# Patient Record
Sex: FEMALE | Race: WHITE | Employment: FULL TIME | URBAN - METROPOLITAN AREA
[De-identification: names, ages, dates, MRNs, and addresses within clinical notes are randomized per-mention and may not be internally consistent; named-entity substitution may affect disease eponyms.]

---

## 2017-01-18 ENCOUNTER — OFFICE VISIT (OUTPATIENT)
Dept: OBGYN | Facility: CLINIC | Age: 47
End: 2017-01-18
Payer: COMMERCIAL

## 2017-01-18 VITALS
DIASTOLIC BLOOD PRESSURE: 86 MMHG | HEIGHT: 65 IN | RESPIRATION RATE: 18 BRPM | WEIGHT: 162.6 LBS | HEART RATE: 73 BPM | SYSTOLIC BLOOD PRESSURE: 126 MMHG | BODY MASS INDEX: 27.09 KG/M2 | OXYGEN SATURATION: 99 %

## 2017-01-18 DIAGNOSIS — Z01.419 ENCOUNTER FOR GYNECOLOGICAL EXAMINATION WITHOUT ABNORMAL FINDING: Primary | ICD-10-CM

## 2017-01-18 DIAGNOSIS — Z85.43 PERSONAL HISTORY OF OVARIAN CANCER: ICD-10-CM

## 2017-01-18 LAB — CANCER AG125 SERPL-ACNC: 7 U/ML (ref 0–30)

## 2017-01-18 PROCEDURE — 99396 PREV VISIT EST AGE 40-64: CPT | Performed by: OBSTETRICS & GYNECOLOGY

## 2017-01-18 PROCEDURE — 86304 IMMUNOASSAY TUMOR CA 125: CPT | Performed by: OBSTETRICS & GYNECOLOGY

## 2017-01-18 PROCEDURE — 36415 COLL VENOUS BLD VENIPUNCTURE: CPT | Performed by: OBSTETRICS & GYNECOLOGY

## 2017-01-18 RX ORDER — OLOPATADINE HYDROCHLORIDE 2 MG/ML
1 SOLUTION/ DROPS OPHTHALMIC DAILY
COMMUNITY
End: 2017-02-06

## 2017-01-18 NOTE — PROGRESS NOTES
Bjorn Sykes is a 46 year old female , who presents for annual exam.   No unusual bleeding, no incontinence, or unusual discharge.   She is being followed for history of ovarian cancer.  She is now being seen annually for the cancer.     Last cholesterol:   Recent Labs   Lab Test  16   0818  12/06/10   0939 08   CHOL  222*  199  181   HDL  76  69  55   LDL  133*  117  117   TRIG  63  70  43   CHOLHDLRATIO   --   2.9  3.3     Past Medical History   Diagnosis Date     Migraine      Adnexal mass 2009     Ovarian carcinoma (H) 2009     LEFT side   Dr.Jonson Curt PANIAGUA     Plantar warts s-     Dysmenorrhea      [facility] Helps     Osteopenia      [facility]     Diagnostic skin and sensitization tests  skin tests per [facility] pos. for M/T only.  skin tests [facility] pos. for: dog/DM/M only (T not retested)     Intermittent asthma        Past Surgical History   Procedure Laterality Date     D & c  2007     cysts in uterus     Hysterectomy total abdominal, bilateral salpingo-oophorectomy, combined  2009     appy also     Appendectomy open  2009     see above     Bunionectomy  10/16/2012     Procedure: BUNIONECTOMY;  Right Theo bunionectomy;  Surgeon: Daniel Arias MD;  Location: [facility]     Hysterectomy, pap no longer indicated         Obstetric History       T0      TAB0   SAB0   E0   M0   L0           Gyn History:  Gynecological History         Patient's last menstrual period was 2009.     no STD/no PID/no IUD      history of abnormal pap smear:  no  Last pap: PAP      NIL   2016        Current Outpatient Prescriptions   Medication Sig Dispense Refill     olopatadine HCl (PATADAY) 0.2 % SOLN 1 drop daily       mometasone (NASONEX) 50 MCG/ACT nasal spray Spray 2 sprays into both nostrils daily 3 Box 4     albuterol (PROAIR HFA, PROVENTIL HFA, VENTOLIN HFA) 108 (90 BASE) MCG/ACT inhaler Inhale 2 puffs into the lungs every 4 hours as needed for shortness of  "breath / dyspnea 1 Inhaler 2     mometasone (ELOCON) 0.1 % cream Apply topically daily as needed Apply 1 dose topically daily as needed 45 g 3     cetirizine (ZYRTEC) 10 MG tablet Take 1 tablet (10 mg) by mouth daily 90 tablet 3     Calcium Carbonate-Vitamin D (CALCIUM 500 + D PO) Take by mouth 2 times daily       Glucosamine-Chondroit-Vit C-Mn (GLUCOSAMINE CHONDR 1500 COMPLX PO) Take  by mouth 2 times daily.       ascorbic acid (VITAMIN C) 500 MG tablet Take 500 mg by mouth as needed.       IBUPROFEN PO Take  by mouth as needed.       MULTIVITAMIN OR once daily         Allergies   Allergen Reactions     Flagyl [Imidazole Antifungals] Rash     Sulfa Drugs Rash       Social History     Social History     Marital Status:      Spouse Name: N/A     Number of Children: N/A     Years of Education: N/A     Occupational History     Not on file.     Social History Main Topics     Smoking status: Never Smoker      Smokeless tobacco: Never Used     Alcohol Use: Yes      Comment: occasional     Drug Use: No     Sexual Activity:     Partners: Male     Birth Control/ Protection: Surgical     Other Topics Concern     Not on file     Social History Narrative       Family History   Problem Relation Age of Onset     Alcohol/Drug Father      C.A.D. Paternal Grandfather      Hypertension Paternal Grandfather      CEREBROVASCULAR DISEASE Paternal Grandfather      Genetic Disorder Sister      Angelman's syndrome     Breast Cancer Other      aunt, maternal     Alzheimer Disease Other      aunt     Thyroid Disease Sister      Thyroid Disease Paternal Grandmother      Macular Degeneration No family hx of      Glaucoma No family hx of          ROS:  All negative except as above.      EXAM:  /86 mmHg  Pulse 73  Resp 18  Ht 5' 4.5\" (1.638 m)  Wt 162 lb 9.6 oz (73.755 kg)  BMI 27.49 kg/m2  SpO2 99%  LMP 07/05/2009  Breastfeeding? No  General:  WNWD female, NAD  Alert  Oriented x 3  Gait:  Normal  Skin:  Normal skin " turgor  Neurologic:  CN grossly intact, good sensation.    HEENT:  NC/AT, EOMI  Neck:  No masses palpated, symmetrical, carotids +2/4, no bruits heard  Heart:  RRR  Lungs:  Clear   Breasts:  Symmetrical, no dimpling noted, no masses palpated, no discharge expressed  Abdomen:  Non-tender, non-distended.  Vulva: No external lesions, normal hair distribution, no adenopathy  BUS:  Normal, no masses noted  Urethra:  No hypermobility noted  Urethral meatus:  No masses noted  Vagina: Moist, pink, no abnormal discharge, well rugated, no lesions  Cervix: Smooth, pink, no visible lesions  Uterus: Normal size, anteverted, non-tender, mobile  Ovaries: No mass, non-tender, mobile  Perianal:  No masses noted.    Rectal exam: No mass, non-tender, normal sphincter tone  Rectovaginal exam:  No masses palpated.    Extremities:  No clubbing, cyanosis, or edema      ASSESSMENT/PLAN   Annual examination   Pap smear not performed.    ordered.   Low fat diet, weight bearing exercises and self breast exams on a monthly basis have been recommended.  I have discussed with patient the risks, benefits, medications, treatment options and modalities.   I have instructed the patient to call or schedule a follow-up appointment if any problems.

## 2017-01-18 NOTE — NURSING NOTE
"Chief Complaint   Patient presents with     Physical     Annual Female       Initial /86 mmHg  Pulse 73  Resp 18  Ht 5' 4.5\" (1.638 m)  Wt 162 lb 9.6 oz (73.755 kg)  BMI 27.49 kg/m2  SpO2 99%  LMP 07/05/2009  Breastfeeding? No Estimated body mass index is 27.49 kg/(m^2) as calculated from the following:    Height as of this encounter: 5' 4.5\" (1.638 m).    Weight as of this encounter: 162 lb 9.6 oz (73.755 kg).  BP completed using cuff size: mel Gage MA 1/18/2017         "

## 2017-02-06 ENCOUNTER — TELEPHONE (OUTPATIENT)
Dept: OPHTHALMOLOGY | Facility: CLINIC | Age: 47
End: 2017-02-06

## 2017-02-06 ENCOUNTER — TELEPHONE (OUTPATIENT)
Dept: FAMILY MEDICINE | Facility: CLINIC | Age: 47
End: 2017-02-06

## 2017-02-06 DIAGNOSIS — J30.2 SEASONAL ALLERGIES: Primary | ICD-10-CM

## 2017-02-06 DIAGNOSIS — H18.519 CORNEA GUTTATA: Primary | ICD-10-CM

## 2017-02-06 DIAGNOSIS — J30.2 SEASONAL ALLERGIES: ICD-10-CM

## 2017-02-06 DIAGNOSIS — J45.20 ASTHMA, INTERMITTENT, UNCOMPLICATED: ICD-10-CM

## 2017-02-06 DIAGNOSIS — L20.89 FLEXURAL ATOPIC DERMATITIS: Primary | ICD-10-CM

## 2017-02-06 RX ORDER — OLOPATADINE HYDROCHLORIDE 2 MG/ML
1 SOLUTION/ DROPS OPHTHALMIC DAILY
Qty: 1 BOTTLE | Refills: 11 | Status: SHIPPED
Start: 2017-02-06 | End: 2018-03-26

## 2017-02-06 RX ORDER — OLOPATADINE HYDROCHLORIDE 2 MG/ML
1 SOLUTION/ DROPS OPHTHALMIC DAILY
Qty: 1 BOTTLE | Refills: 11 | Status: SHIPPED | OUTPATIENT
Start: 2017-02-06 | End: 2017-02-06

## 2017-02-06 NOTE — Clinical Note
[address] [place] [address]  [phone]      February 9, 2017      Bjorn Sykes  [address] [place] [address]              Dear Bjorn,    Regarding recent refill request's we received- refill's have been sent to the pharmacy.  Please contact our office if you have any questions.      Sincerely,      Lalo Ron MD

## 2017-02-06 NOTE — TELEPHONE ENCOUNTER
mometasone (ELOCON) 0.1 % cream      Last Written Prescription Date: 11/10/15  Last Fill Quantity: 45G,  # refills: 3   Last Office Visit with [facility], [facility] or [facility] prescribing provider: 11/10/15                                             mometasone (NASONEX) 50 MCG/ACT nasal spray      Last Written Prescription Date: 11/10/15  Last Fill Quantity: 3BOX,  # refills: 4   Last Office Visit with [facility], [facility] or [facility] prescribing provider: 11/10/15

## 2017-02-06 NOTE — TELEPHONE ENCOUNTER
Received an email from [employer] requesting the completion of a Prior Auth for Dashawn.    Request completed.

## 2017-02-07 RX ORDER — MOMETASONE FUROATE MONOHYDRATE 50 UG/1
2 SPRAY, METERED NASAL DAILY
Qty: 3 BOX | Refills: 4 | Status: CANCELLED | OUTPATIENT
Start: 2017-02-07

## 2017-02-07 NOTE — TELEPHONE ENCOUNTER
Mometasone 50 mcg        Last Written Prescription Date: 9/4/16  Last Fill Quantity: 51, # refills: 3 box    Last Office Visit with [facility] or [facility] prescribing provider:  11/10/15   Future Office Visit:   none    Date of Last Asthma Action Plan Letter:   There are no preventive care reminders to display for this patient.   Asthma Control Test:   ACT Total Scores 11/10/2015   ACT TOTAL SCORE -   ASTHMA ER VISITS -   ASTHMA HOSPITALIZATIONS -   ACT TOTAL SCORE (Goal Greater than or Equal to 20) 24   In the past 12 months, how many times did you visit the emergency room for your asthma without being admitted to the hospital? 0   In the past 12 months, how many times were you hospitalized overnight because of your asthma? 0       Date of Last Spirometry Test:   No results found for this or any previous visit.

## 2017-02-09 RX ORDER — MOMETASONE FUROATE MONOHYDRATE 50 UG/1
2 SPRAY, METERED NASAL DAILY
Qty: 3 BOX | Refills: 4 | Status: SHIPPED | OUTPATIENT
Start: 2017-02-09 | End: 2018-03-12

## 2017-02-09 RX ORDER — MOMETASONE FUROATE 1 MG/G
CREAM TOPICAL DAILY PRN
Qty: 45 G | Refills: 3 | Status: SHIPPED | OUTPATIENT
Start: 2017-02-09 | End: 2018-03-26

## 2017-02-09 NOTE — TELEPHONE ENCOUNTER
Previous patient of Dr. Vee  Would need to see allergy or pcp to refill her medication  Maida Esquivel MD

## 2017-02-09 NOTE — TELEPHONE ENCOUNTER
Sent over Zaditor to see if insurance covers this instead of Pataday.  Can sub for Patanol if necessary

## 2017-02-09 NOTE — TELEPHONE ENCOUNTER
Routing refill request to provider for review/approval because:  Patient needs to be seen because it has been more than 1 year since last office visit.    Angeli HUBBARD RN, BSN

## 2017-02-13 ENCOUNTER — TELEPHONE (OUTPATIENT)
Dept: OPHTHALMOLOGY | Facility: CLINIC | Age: 47
End: 2017-02-13

## 2017-02-13 NOTE — TELEPHONE ENCOUNTER
Spoke to pharmacy and they have the generic Patanol ready for her to .  We had received a denial letter for Pataday, so substituted the generic.

## 2017-02-14 ENCOUNTER — TELEPHONE (OUTPATIENT)
Dept: OBGYN | Facility: CLINIC | Age: 47
End: 2017-02-14

## 2017-02-14 DIAGNOSIS — J45.20 MILD INTERMITTENT ASTHMA WITHOUT COMPLICATION: ICD-10-CM

## 2017-02-14 RX ORDER — ALBUTEROL SULFATE 90 UG/1
2 AEROSOL, METERED RESPIRATORY (INHALATION) EVERY 4 HOURS PRN
Qty: 1 INHALER | Refills: 2 | Status: SHIPPED | OUTPATIENT
Start: 2017-02-14 | End: 2018-03-26

## 2017-02-14 NOTE — TELEPHONE ENCOUNTER
Reason for call: Medication   If this is a refill request, has the caller requested the refill from the pharmacy already? No  Will the patient be using a [facility]? No  Name of the pharmacy and phone number for the current request: [facility] [address] [facility], [place] - [address] [facility]    Name of the medication requested: albuterol (PROAIR HFA, PROVENTIL HFA, VENTOLIN HFA    Other request: Original provider has left the clinic; is running a 10K and noticed the current inhaler has .    Phone Number Pt can be reached at: Home number on file [phone] (home)  Best Time: anytime  Can we leave a detailed message on this number? YES

## 2017-02-14 NOTE — TELEPHONE ENCOUNTER
Last seen in clinic 1/18/2017 for her annual physical.  Asthma was not addressed.  Last ACT score was in 2015.  Need a new order.  Routed to Dr. Ron.  ACT Total Scores 9/18/2013 11/10/2015   ACT TOTAL SCORE 25 -   ASTHMA ER VISITS 0 = None -   ASTHMA HOSPITALIZATIONS 0 = None -   ACT TOTAL SCORE (Goal Greater than or Equal to 20) - 24   In the past 12 months, how many times did you visit the emergency room for your asthma without being admitted to the hospital? - 0   In the past 12 months, how many times were you hospitalized overnight because of your asthma? - 0       Esperanza Womack RN

## 2017-09-24 ENCOUNTER — HEALTH MAINTENANCE LETTER (OUTPATIENT)
Age: 47
End: 2017-09-24

## 2018-02-05 ENCOUNTER — OFFICE VISIT (OUTPATIENT)
Dept: FAMILY MEDICINE | Facility: CLINIC | Age: 48
End: 2018-02-05
Payer: COMMERCIAL

## 2018-02-05 VITALS
HEIGHT: 65 IN | HEART RATE: 82 BPM | DIASTOLIC BLOOD PRESSURE: 78 MMHG | WEIGHT: 163 LBS | TEMPERATURE: 99.4 F | BODY MASS INDEX: 27.16 KG/M2 | OXYGEN SATURATION: 98 % | SYSTOLIC BLOOD PRESSURE: 126 MMHG

## 2018-02-05 DIAGNOSIS — R07.0 THROAT PAIN: ICD-10-CM

## 2018-02-05 DIAGNOSIS — J11.1 INFLUENZA-LIKE ILLNESS: Primary | ICD-10-CM

## 2018-02-05 LAB
DEPRECATED S PYO AG THROAT QL EIA: NORMAL
SPECIMEN SOURCE: NORMAL

## 2018-02-05 PROCEDURE — 99213 OFFICE O/P EST LOW 20 MIN: CPT | Performed by: PHYSICIAN ASSISTANT

## 2018-02-05 PROCEDURE — 87081 CULTURE SCREEN ONLY: CPT | Performed by: PHYSICIAN ASSISTANT

## 2018-02-05 PROCEDURE — 87880 STREP A ASSAY W/OPTIC: CPT | Performed by: PHYSICIAN ASSISTANT

## 2018-02-05 RX ORDER — OSELTAMIVIR PHOSPHATE 75 MG/1
75 CAPSULE ORAL 2 TIMES DAILY
Qty: 10 CAPSULE | Refills: 0 | Status: SHIPPED | OUTPATIENT
Start: 2018-02-05 | End: 2018-03-26

## 2018-02-05 NOTE — NURSING NOTE
"Chief Complaint   Patient presents with     Flu Symptoms       Initial /78  Pulse 82  Temp 99.4  F (37.4  C) (Tympanic)  Ht 5' 4.5\" (1.638 m)  Wt 163 lb (73.9 kg)  LMP 07/05/2009  SpO2 98%  BMI 27.55 kg/m2 Estimated body mass index is 27.55 kg/(m^2) as calculated from the following:    Height as of this encounter: 5' 4.5\" (1.638 m).    Weight as of this encounter: 163 lb (73.9 kg).  Medication Reconciliation: complete     Saúl Juarez, SHAYY    "

## 2018-02-05 NOTE — PATIENT INSTRUCTIONS
Influenza (Adult)    Influenza is also called the flu. It is a viral illness that affects the air passages of your lungs. It is different from the common cold. The flu can easily be passed from one to person to another. It may be spread through the air by coughing and sneezing. Or it can be spread by touching the sick person and then touching your own eyes, nose, or mouth.  The flu starts 1 to 3 days after you are exposed to the flu virus. It may last for 1 to 2 weeks but many people feel tired or fatigued for many weeks afterward. You usually don t need to take antibiotics unless you have a complication. This might be an ear or sinus infection or pneumonia.  Symptoms of the flu may be mild or severe. They can include extreme tiredness (wanting to stay in bed all day), chills, fevers, muscle aches, soreness with eye movement, headache, and a dry, hacking cough.  Home care  Follow these guidelines when caring for yourself at home:    Avoid being around cigarette smoke, whether yours or other people s.    Acetaminophen or ibuprofen will help ease your fever, muscle aches, and headache. Don t give aspirin to anyone younger than 18 who has the flu. Aspirin can harm the liver.    Nausea and loss of appetite are common with the flu. Eat light meals. Drink 6 to 8 glasses of liquids every day. Good choices are water, sport drinks, soft drinks without caffeine, juices, tea, and soup. Extra fluids will also help loosen secretions in your nose and lungs.    Over-the-counter cold medicines will not make the flu go away faster. But the medicines may help with coughing, sore throat, and congestion in your nose and sinuses. Don t use a decongestant if you have high blood pressure.    Stay home until your fever has been gone for at least 24 hours without using medicine to reduce fever.  Follow-up care  Follow up with your healthcare provider, or as advised, if you are not getting better over the next week.  If you are age 65 or  older, talk with your provider about getting a pneumococcal vaccine every 5 years. You should also get this vaccine if you have chronic asthma or COPD. All adults should get a flu vaccine every fall. Ask your provider about this.  When to seek medical advice  Call your healthcare provider right away if any of these occur:    Cough with lots of colored mucus (sputum) or blood in your mucus    Chest pain, shortness of breath, wheezing, or trouble breathing    Severe headache, or face, neck, or ear pain    New rash with fever    Fever of 100.4 F (38 C) or higher, or as directed by your healthcare provider    Confusion, behavior change, or seizure    Severe weakness or dizziness    You get a new fever or cough after getting better for a few days  Date Last Reviewed: 1/1/2017 2000-2017 [employer]. [address] [place] [address], [place] [address]. All rights reserved. This information is not intended as a substitute for professional medical care. Always follow your healthcare professional's instructions.

## 2018-02-05 NOTE — MR AVS SNAPSHOT
After Visit Summary   2/5/2018    Bjorn Sykes    MRN: [id number]           Patient Information     Date Of Birth          1970        Visit Information        Provider Department      2/5/2018 1:40 PM Jaqui Krishnamurthy PA-C [facility]        Today's Diagnoses     Influenza-like illness    -  1    Throat pain          Care Instructions      Influenza (Adult)    Influenza is also called the flu. It is a viral illness that affects the air passages of your lungs. It is different from the common cold. The flu can easily be passed from one to person to another. It may be spread through the air by coughing and sneezing. Or it can be spread by touching the sick person and then touching your own eyes, nose, or mouth.  The flu starts 1 to 3 days after you are exposed to the flu virus. It may last for 1 to 2 weeks but many people feel tired or fatigued for many weeks afterward. You usually don t need to take antibiotics unless you have a complication. This might be an ear or sinus infection or pneumonia.  Symptoms of the flu may be mild or severe. They can include extreme tiredness (wanting to stay in bed all day), chills, fevers, muscle aches, soreness with eye movement, headache, and a dry, hacking cough.  Home care  Follow these guidelines when caring for yourself at home:    Avoid being around cigarette smoke, whether yours or other people s.    Acetaminophen or ibuprofen will help ease your fever, muscle aches, and headache. Don t give aspirin to anyone younger than 18 who has the flu. Aspirin can harm the liver.    Nausea and loss of appetite are common with the flu. Eat light meals. Drink 6 to 8 glasses of liquids every day. Good choices are water, sport drinks, soft drinks without caffeine, juices, tea, and soup. Extra fluids will also help loosen secretions in your nose and lungs.    Over-the-counter cold medicines will not make the flu go away faster. But the medicines  may help with coughing, sore throat, and congestion in your nose and sinuses. Don t use a decongestant if you have high blood pressure.    Stay home until your fever has been gone for at least 24 hours without using medicine to reduce fever.  Follow-up care  Follow up with your healthcare provider, or as advised, if you are not getting better over the next week.  If you are age 65 or older, talk with your provider about getting a pneumococcal vaccine every 5 years. You should also get this vaccine if you have chronic asthma or COPD. All adults should get a flu vaccine every fall. Ask your provider about this.  When to seek medical advice  Call your healthcare provider right away if any of these occur:    Cough with lots of colored mucus (sputum) or blood in your mucus    Chest pain, shortness of breath, wheezing, or trouble breathing    Severe headache, or face, neck, or ear pain    New rash with fever    Fever of 100.4 F (38 C) or higher, or as directed by your healthcare provider    Confusion, behavior change, or seizure    Severe weakness or dizziness    You get a new fever or cough after getting better for a few days  Date Last Reviewed: 1/1/2017 2000-2017 The "[employer]". [address] [place] [address]. All rights reserved. This information is not intended as a substitute for professional medical care. Always follow your healthcare professional's instructions.                Follow-ups after your visit        Who to contact     Normal or non-critical lab and imaging results will be communicated to you by MyChart, letter or phone within 4 business days after the clinic has received the results. If you do not hear from us within 7 days, please contact the clinic through MyChart or phone. If you have a critical or abnormal lab result, we will notify you by phone as soon as possible.  Submit refill requests through [employer] or call your pharmacy and they will forward the refill request to us.  "Please allow 3 business days for your refill to be completed.          If you need to speak with a  for additional information , please call: [phone]             Additional Information About Your Visit        [email or web address] Information     [employer] gives you secure access to your electronic health record. If you see a primary care provider, you can also send messages to your care team and make appointments. If you have questions, please call your primary care clinic.  If you do not have a primary care provider, please call [phone] and they will assist you.        Care EveryWhere ID     This is your Care EveryWhere ID. This could be used by other organizations to access your [facility] medical records  [id number]        Your Vitals Were     Pulse Temperature Height Last Period Pulse Oximetry BMI (Body Mass Index)    82 99.4  F (37.4  C) (Tympanic) 5' 4.5\" (1.638 m) 07/05/2009 98% 27.55 kg/m2       Blood Pressure from Last 3 Encounters:   02/05/18 126/78   01/18/17 126/86   12/20/16 (!) 154/95    Weight from Last 3 Encounters:   02/05/18 163 lb (73.9 kg)   01/18/17 162 lb 9.6 oz (73.8 kg)   05/06/16 165 lb (74.8 kg)              We Performed the Following     Beta strep group A culture     Strep, Rapid Screen          Today's Medication Changes          These changes are accurate as of 2/5/18  2:16 PM.  If you have any questions, ask your nurse or doctor.               Start taking these medicines.        Dose/Directions    oseltamivir 75 MG capsule   Commonly known as:  TAMIFLU   Used for:  Influenza-like illness   Started by:  Jaqui Krishnamurthy PA-C        Dose:  75 mg   Take 1 capsule (75 mg) by mouth 2 times daily   Quantity:  10 capsule   Refills:  0            Where to get your medicines      These medications were sent to [facility] - [address]  [address] [place] [address]     Phone:  [phone]     oseltamivir 75 MG capsule       "          Primary Care Provider Office Phone # Fax #    Lalo Denzel Ron [phone] [address] [facility]  [facility] [address]        Equal Access to Services     STEPHON LING : Hadii jon ku zulyo Somontserratali, waaxda luqadaha, qaybta kaalmada adehector, whitley royaltaya mcdonald. So [facility] [phone].    ATENCIÓN: Si habla español, tiene a simmons disposición servicios gratuitos de asistencia lingüística. Llame al [phone].    We comply with applicable federal civil rights laws and [place] laws. We do not discriminate on the basis of race, color, national origin, age, disability, sex, sexual orientation, or gender identity.            Thank you!     Thank you for choosing [facility]  for your care. Our goal is always to provide you with excellent care. Hearing back from our patients is one way we can continue to improve our services. Please take a few minutes to complete the written survey that you may receive in the mail after your visit with us. Thank you!             Your Updated Medication List - Protect others around you: Learn how to safely use, store and throw away your medicines at www.disposemymeds.org.          This list is accurate as of 2/5/18  2:16 PM.  Always use your most recent med list.                   Brand Name Dispense Instructions for use Diagnosis    albuterol 108 (90 BASE) MCG/ACT Inhaler    PROAIR HFA/PROVENTIL HFA/VENTOLIN HFA    1 Inhaler    Inhale 2 puffs into the lungs every 4 hours as needed for shortness of breath / dyspnea    Mild intermittent asthma without complication       ascorbic acid 500 MG tablet    VITAMIN C     Take 500 mg by mouth as needed.        CALCIUM 500 + D PO      Take by mouth 2 times daily        cetirizine 10 MG tablet    zyrTEC    90 tablet    Take 1 tablet (10 mg) by mouth daily    Dermatitis       GLUCOSAMINE CHONDR 1500 COMPLX PO      Take  by mouth 2 times daily.        IBUPROFEN PO      Take  by mouth  as needed.        ketotifen 0.025 % Soln ophthalmic solution    ZADITOR/REFRESH ANTI-ITCH    1 Bottle    Place 1 drop into both eyes 2 times daily    Seasonal allergies       mometasone 0.1 % cream    ELOCON    45 g    Apply topically daily as needed Apply 1 dose topically daily as needed    Flexural atopic dermatitis       mometasone 50 MCG/ACT spray    NASONEX    3 Box    Spray 2 sprays into both nostrils daily    Asthma, intermittent, uncomplicated       MULTIVITAMIN PO      once daily    Pelvic mass       olopatadine HCl 0.2 % Soln    PATADAY    1 Bottle    Place 1 drop into both eyes daily    Cornea guttata, Seasonal allergies       oseltamivir 75 MG capsule    TAMIFLU    10 capsule    Take 1 capsule (75 mg) by mouth 2 times daily    Influenza-like illness

## 2018-02-06 LAB
BACTERIA SPEC CULT: NORMAL
SPECIMEN SOURCE: NORMAL

## 2018-03-12 DIAGNOSIS — J45.20 ASTHMA, INTERMITTENT, UNCOMPLICATED: ICD-10-CM

## 2018-03-12 NOTE — TELEPHONE ENCOUNTER
Routing refill request to provider for review/approval because:  Drug not on the [facility] refill protocol       Requested Prescriptions   Pending Prescriptions Disp Refills     mometasone (NASONEX) 50 MCG/ACT spray 3 Box 4     Sig: Spray 2 sprays into both nostrils daily    There is no refill protocol information for this order        Isamar Esquivel, DIONE - BC

## 2018-03-22 RX ORDER — MOMETASONE FUROATE MONOHYDRATE 50 UG/1
2 SPRAY, METERED NASAL DAILY
Qty: 1 BOX | Refills: 0 | Status: SHIPPED | OUTPATIENT
Start: 2018-03-22 | End: 2018-03-26

## 2018-03-22 NOTE — TELEPHONE ENCOUNTER
Message handled by Nurse Triage with [facility] - provider name: Dr Ron.Refilling maximo refill of Nasonex x 1 month    Next 5 appointments (look out 90 days)     Mar 26, 2018  4:40 PM CDT   [facility] with Maulik Titus MD   [facility]    [address] [address]   [phone]            Apr 04, 2018  1:15 PM CDT   PHYSICAL with Lalo Ron MD   [facility]    [address] [place] [address]  [place] [address]   [phone]                 Abby Cullen RN

## 2018-03-26 ENCOUNTER — OFFICE VISIT (OUTPATIENT)
Dept: ALLERGY | Facility: CLINIC | Age: 48
End: 2018-03-26
Payer: COMMERCIAL

## 2018-03-26 VITALS
TEMPERATURE: 97.1 F | HEART RATE: 64 BPM | WEIGHT: 162.48 LBS | DIASTOLIC BLOOD PRESSURE: 86 MMHG | RESPIRATION RATE: 16 BRPM | BODY MASS INDEX: 27.46 KG/M2 | SYSTOLIC BLOOD PRESSURE: 132 MMHG | OXYGEN SATURATION: 100 %

## 2018-03-26 DIAGNOSIS — L30.8 OTHER ECZEMA: ICD-10-CM

## 2018-03-26 DIAGNOSIS — J30.1 CHRONIC SEASONAL ALLERGIC RHINITIS DUE TO POLLEN: Primary | ICD-10-CM

## 2018-03-26 DIAGNOSIS — H10.13 ALLERGIC CONJUNCTIVITIS, BILATERAL: ICD-10-CM

## 2018-03-26 DIAGNOSIS — J45.20 ASTHMA, INTERMITTENT, UNCOMPLICATED: ICD-10-CM

## 2018-03-26 PROCEDURE — 94010 BREATHING CAPACITY TEST: CPT | Performed by: ALLERGY & IMMUNOLOGY

## 2018-03-26 PROCEDURE — 99214 OFFICE O/P EST MOD 30 MIN: CPT | Mod: 25 | Performed by: ALLERGY & IMMUNOLOGY

## 2018-03-26 RX ORDER — MOMETASONE FUROATE MONOHYDRATE 50 UG/1
2 SPRAY, METERED NASAL DAILY
Qty: 3 BOX | Refills: 3 | Status: SHIPPED | OUTPATIENT
Start: 2018-03-26 | End: 2019-04-23

## 2018-03-26 RX ORDER — OLOPATADINE HYDROCHLORIDE 2 MG/ML
1 SOLUTION/ DROPS OPHTHALMIC DAILY
Qty: 1 BOTTLE | Refills: 11 | Status: SHIPPED | OUTPATIENT
Start: 2018-03-26 | End: 2019-04-10

## 2018-03-26 RX ORDER — ALBUTEROL SULFATE 90 UG/1
2 AEROSOL, METERED RESPIRATORY (INHALATION) EVERY 4 HOURS PRN
Qty: 1 INHALER | Refills: 2 | Status: SHIPPED | OUTPATIENT
Start: 2018-03-26

## 2018-03-26 RX ORDER — MOMETASONE FUROATE 1 MG/G
CREAM TOPICAL DAILY PRN
Qty: 45 G | Refills: 3 | Status: SHIPPED | OUTPATIENT
Start: 2018-03-26

## 2018-03-26 ASSESSMENT — ENCOUNTER SYMPTOMS
WHEEZING: 0
MYALGIAS: 0
CHEST TIGHTNESS: 0
ARTHRALGIAS: 0
EYE REDNESS: 0
HEADACHES: 0
NAUSEA: 0
FATIGUE: 1
EYE DISCHARGE: 0
COUGH: 0
VOMITING: 0
ADENOPATHY: 0
FACIAL SWELLING: 0
SINUS PRESSURE: 0
DIARRHEA: 0
CHILLS: 0
RHINORRHEA: 1
EYE ITCHING: 0
JOINT SWELLING: 0
SHORTNESS OF BREATH: 0
ACTIVITY CHANGE: 0
FEVER: 0

## 2018-03-26 NOTE — MR AVS SNAPSHOT
After Visit Summary   3/26/2018    Bjorn Sykes    MRN: [id number]           Patient Information     Date Of Birth          1970        Visit Information        Provider Department      3/26/2018 4:40 PM Maulik Titus MD [facility]        Today's Diagnoses     Chronic seasonal allergic rhinitis due to pollen    -  1    Asthma, intermittent, uncomplicated        Allergic conjunctivitis, bilateral        Other eczema           Follow-ups after your visit        Follow-up notes from your care team     Return in about 1 year (around 3/26/2019), or if symptoms worsen or fail to improve, for rhinitis follow up, asthma follow up, eczema follow up.      Your next 10 appointments already scheduled     Apr 04, 2018 12:45 PM CDT   (Arrive by 12:30 PM)   MA SCREENING DIGITAL BILATERAL with FKMA1   [facility]    [address] [place] [id number] [phone]           Do not use any powder, lotion or deodorant under your arms or on your breast. If you do, we will ask you to remove it before your exam.  Wear comfortable, two-piece clothing.  If you have any allergies, tell your care team.  Bring any previous mammograms from other facilities or have them mailed to the breast center.            Apr 04, 2018  1:15 PM CDT   PHYSICAL with Lalo Ron MD   [facility]    [address] [place] [id number] [phone]              Who to contact     If you have questions or need follow up information about today's clinic visit or your schedule please contact [facility] directly at [phone].  Normal or non-critical lab and imaging results will be communicated to you by MyChart, letter or phone within 4 business days after the clinic has received the results. If you do not hear from us within 7 days, please contact the clinic through MyChart or phone. If you  have a critical or abnormal lab result, we will notify you by phone as soon as possible.  Submit refill requests through [employer] or call your pharmacy and they will forward the refill request to us. Please allow 3 business days for your refill to be completed.          Additional Information About Your Visit        [employer] Information     [employer] gives you secure access to your electronic health record. If you see a primary care provider, you can also send messages to your care team and make appointments. If you have questions, please call your primary care clinic.  If you do not have a primary care provider, please call [phone] and they will assist you.        Care EveryWhere ID     This is your Care EveryWhere ID. This could be used by other organizations to access your [place] medical records  [id number]        Your Vitals Were     Pulse Temperature Respirations Last Period Pulse Oximetry BMI (Body Mass Index)    64 97.1  F (36.2  C) (Oral) 16 07/05/2009 100% 27.46 kg/m2       Blood Pressure from Last 3 Encounters:   03/26/18 132/86   02/05/18 126/78   01/18/17 126/86    Weight from Last 3 Encounters:   03/26/18 73.7 kg (162 lb 7.7 oz)   02/05/18 73.9 kg (163 lb)   01/18/17 73.8 kg (162 lb 9.6 oz)              Today, you had the following     No orders found for display         Today's Medication Changes          These changes are accurate as of 3/26/18  5:35 PM.  If you have any questions, ask your nurse or doctor.               Stop taking these medicines if you haven't already. Please contact your care team if you have questions.     ketotifen 0.025 % Soln ophthalmic solution   Commonly known as:  ZADITOR/REFRESH ANTI-ITCH   Stopped by:  Maulik Titus MD           oseltamivir 75 MG capsule   Commonly known as:  TAMIFLU   Stopped by:  Maulik Titus MD                Where to get your medicines      These medications were sent to [facility] [address] [place], [place] - [address] [facility]  [address] [place] [facility] [place] [address]     Phone:  [phone]     albuterol 108 (90 BASE) MCG/ACT Inhaler    mometasone 0.1 % cream    mometasone 50 MCG/ACT spray    olopatadine HCl 0.2 % Soln                Primary Care Provider Office Phone # Fax #    Lalo Denzel Ron [phone]       [address] [facility] [address]        Equal Access to Services     STEPHON LING : Hadii aad ku hadasho Soomaali, waaxda luqadaha, qaybta kaalmada adeegyada, waxay idiin hayaan adeeg khbrijesh maldonado . So [facility] [phone].    ATENCIÓN: Si habla español, tiene a simmons disposición servicios gratuitos de asistencia lingüística. [facility] [phone].    We comply with applicable federal civil rights laws and [place] laws. We do not discriminate on the basis of race, color, national origin, age, disability, sex, sexual orientation, or gender identity.            Thank you!     Thank you for choosing [facility]  for your care. Our goal is always to provide you with excellent care. Hearing back from our patients is one way we can continue to improve our services. Please take a few minutes to complete the written survey that you may receive in the mail after your visit with us. Thank you!             Your Updated Medication List - Protect others around you: Learn how to safely use, store and throw away your medicines at www.disposemymeds.org.          This list is accurate as of 3/26/18  5:35 PM.  Always use your most recent med list.                   Brand Name Dispense Instructions for use Diagnosis    albuterol 108 (90 BASE) MCG/ACT Inhaler    PROAIR HFA/PROVENTIL HFA/VENTOLIN HFA    1 Inhaler    Inhale 2 puffs into the lungs every 4 hours as needed for shortness of breath / dyspnea    Asthma, intermittent, uncomplicated       ascorbic acid 500 MG tablet    VITAMIN C     Take 500 mg by mouth as needed.        CALCIUM 500 + D PO      Take by mouth 2 times daily         cetirizine 10 MG tablet    zyrTEC    90 tablet    Take 1 tablet (10 mg) by mouth daily    Dermatitis       GLUCOSAMINE CHONDR 1500 COMPLX PO      Take  by mouth 2 times daily.        IBUPROFEN PO      Take  by mouth as needed.        mometasone 0.1 % cream    ELOCON    45 g    Apply topically daily as needed Apply 1 dose topically daily as needed    Other eczema       mometasone 50 MCG/ACT spray    NASONEX    3 Box    Spray 2 sprays into both nostrils daily    Asthma, intermittent, uncomplicated       MULTIVITAMIN PO      once daily    Pelvic mass       olopatadine HCl 0.2 % Soln    PATADAY    1 Bottle    Place 1 drop into both eyes daily    Allergic conjunctivitis, bilateral

## 2018-03-26 NOTE — LETTER
3/26/2018         RE: Bjorn Sykes  [address] [place] [address]        Dear Colleague,    Thank you for referring your patient, Bjorn Sykes, to the [facility]. Please see a copy of my visit note below.    SUBJECTIVE:                                                               Bjorn Sykes presents today to our Allergy Clinic at [facility] for a new patient visit.  As you know, she is a 47 year old female with  a history of asthma, eczema and allergic rhinitis.     She was previously a patient of Dr. Josiah Juarez and Dr. Vee.  The patient has a history of allergic rhinoconjunctivitis since age of 17 years. Per Dr. Vee note, skin test in the past was positive for dog, dust mite and mold. Before that birch was positive as well.   In Spring, she is using Nasonex 2 sprays in each nostril twice daily.  Rest of the seasons, she is using Nasonex 2 sprays in each nostril daily.  For her ocular symptoms she is using Pataday as needed.  On that regimen she is pretty well controlled.    She is using Nasonex 2 sprays in each nostril daily, and in Spring BID.  For her ocular symptoms she is using Pataday.  On that regimen she is pretty well controlled.  She never considered allergen immunotherapy.  Asthma symptoms, mainly exercise-induced, started in 00's.  She uses albuterol after exertion only if she develops symptoms since she is not symptomatic all the time.  She has never been hospitalized for chest symptoms.  She is not using albuterol inhaler more than twice a week.  She denies night awakenings.  She was not on prednisone for the last year.    She has been having eczema on her hands, intertriginous areas.  Usually well controlled with moisturizers, but sometimes would require Elocon with good results.      Patient Active Problem List   Diagnosis     Personal history of ovarian cancer     CARDIOVASCULAR SCREENING; LDL GOAL LESS THAN 160     Cornea  guttata, mild, ou     Hallux valgus, acquired     Diagnostic skin and sensitization tests(aka ALLERGENS)       Past Medical History:   Diagnosis Date     Adnexal mass 8/2009     Diagnostic skin and sensitization [facility]. 9/00 skin tests [facility] pos. for: dog/DM/M only (T not retested)     Dysmenorrhea     IBU Helps     Intermittent asthma      Migraine      Osteopenia     [facility]     Ovarian carcinoma (H) 8/2009    LEFT side   Dr.Jonson Curt PANIAGUA     Plantar warts 1990's-2001      Problem (# of Occurrences) Relation (Name,Age of Onset)    Alcohol/Drug (1) Father    Alzheimer Disease (1) Other: aunt    Breast Cancer (1) Other: aunt, maternal    C.A.D. (1) Paternal Grandfather    CEREBROVASCULAR DISEASE (1) Paternal Grandfather    Genetic Disorder (1) Sister (1): Angelman's syndrome    Hypertension (1) Paternal Grandfather    Thyroid Disease (2) Paternal Grandmother, Sister       Negative family history of: Macular Degeneration, Glaucoma        Past Surgical History:   Procedure Laterality Date     APPENDECTOMY OPEN  8/13/2009    see above     BUNIONECTOMY  10/16/2012    Procedure: BUNIONECTOMY;  Right Theo bunionectomy;  Surgeon: Daniel Arias MD;  Location: [facility] OR     D & C  8/2007    cysts in uterus     HYSTERECTOMY TOTAL ABDOMINAL, BILATERAL SALPINGO-OOPHORECTOMY, COMBINED  8/13/2009    appy also     HYSTERECTOMY, PAP NO LONGER INDICATED       Social History     Social History     Marital status:      Spouse name: N/A     Number of children: N/A     Years of education: N/A     Occupational History     pharmacist      Social History Main Topics     Smoking status: Never Smoker     Smokeless tobacco: Never Used     Alcohol use Yes      Comment: occasional     Drug use: No     Sexual activity: Yes     Partners: Male     Birth control/ protection: Surgical     Other Topics Concern     None     Social History Narrative    March 26, 2018    ENVIRONMENTAL HISTORY: [place] The home is heated with a forced air. They do have central air conditioning. The patient's bedroom is furnished with hard jorge in bedroom, allergen mattress cover and allergen pillowcase cover. No pets inside the house. There is no  history of cockroach or mice infestation. There are no smokers in the house.  The house does have a damp basement, use a dehumidifier in the summer.    1961           Review of Systems   Constitutional: Positive for fatigue. Negative for activity change, chills and fever.   HENT: Positive for postnasal drip, rhinorrhea and sneezing. Negative for congestion, dental problem, ear pain, facial swelling, nosebleeds and sinus pressure.    Eyes: Negative for discharge, redness and itching.   Respiratory: Negative for cough, chest tightness, shortness of breath and wheezing.    Cardiovascular: Negative for chest pain.   Gastrointestinal: Negative for diarrhea, nausea and vomiting.   Musculoskeletal: Negative for arthralgias, joint swelling and myalgias.   Skin: Positive for rash (on hands occ.).   Neurological: Negative for headaches.   Hematological: Negative for adenopathy.   Psychiatric/Behavioral: Negative for behavioral problems and self-injury.           Current Outpatient Prescriptions:      olopatadine HCl (PATADAY) 0.2 % SOLN, Place 1 drop into both eyes daily, Disp: 1 Bottle, Rfl: 11     mometasone (NASONEX) 50 MCG/ACT spray, Spray 2 sprays into both nostrils daily, Disp: 3 Box, Rfl: 3     mometasone (ELOCON) 0.1 % cream, Apply topically daily as needed Apply 1 dose topically daily as needed, Disp: 45 g, Rfl: 3     albuterol (PROAIR HFA/PROVENTIL HFA/VENTOLIN HFA) 108 (90 BASE) MCG/ACT Inhaler, Inhale 2 puffs into the lungs every 4 hours as needed for shortness of breath / dyspnea, Disp: 1 Inhaler, Rfl: 2     Calcium Carbonate-Vitamin D (CALCIUM 500 + D PO), Take by mouth 2 times daily, Disp: , Rfl:       Glucosamine-Chondroit-Vit C-Mn (GLUCOSAMINE CHONDR 1500 COMPLX PO), Take  by mouth 2 times daily., Disp: , Rfl:      ascorbic acid (VITAMIN C) 500 MG tablet, Take 500 mg by mouth as needed., Disp: , Rfl:      IBUPROFEN PO, Take  by mouth as needed., Disp: , Rfl:      MULTIVITAMIN OR, once daily, Disp: , Rfl:      [DISCONTINUED] albuterol (PROAIR HFA/PROVENTIL HFA/VENTOLIN HFA) 108 (90 BASE) MCG/ACT Inhaler, Inhale 2 puffs into the lungs every 4 hours as needed for shortness of breath / dyspnea, Disp: 1 Inhaler, Rfl: 2     cetirizine (ZYRTEC) 10 MG tablet, Take 1 tablet (10 mg) by mouth daily (Patient not taking: Reported on 3/26/2018), Disp: 90 tablet, Rfl: 3  Immunization History   Administered Date(s) Administered     Influenza Intranasal Vaccine 10/20/2015     TD (ADULT, 7+) 01/15/2006, 07/31/2007     Allergies   Allergen Reactions     Flagyl [Imidazole Antifungals] Rash     Sulfa Drugs Rash     OBJECTIVE:                                                                 /86 (BP Location: Left arm, Patient Position: Sitting, Cuff Size: Adult Regular)  Pulse 64  Temp 97.1  F (36.2  C) (Oral)  Resp 16  Wt 73.7 kg (162 lb 7.7 oz)  LMP 07/05/2009  SpO2 100%  BMI 27.46 kg/m2        Physical Exam   Constitutional: No distress.   HENT:   Head: Normocephalic and atraumatic.   Right Ear: Tympanic membrane, external ear and ear canal normal.   Left Ear: Tympanic membrane, external ear and ear canal normal.   Nose: No mucosal edema or rhinorrhea.   Mouth/Throat: Oropharynx is clear and moist and mucous membranes are normal.   Eyes: Conjunctivae are normal. Right eye exhibits no discharge. Left eye exhibits no discharge.   Neck: Normal range of motion.   Cardiovascular: Normal rate, regular rhythm and normal heart sounds.    No murmur heard.  Pulmonary/Chest: Effort normal and breath sounds normal. No respiratory distress. She has no wheezes. She has no rales.   Musculoskeletal: Normal range of motion.    Lymphadenopathy:     She has no cervical adenopathy.   Neurological: She is alert.   Skin: Skin is warm. She is not diaphoretic.   Mild xerosis of the skin in intertriginous areas of both hands.  No active dermatitis noted.   Psychiatric: Affect normal.   Nursing note and vitals reviewed.            WORKUP:   SPIROMETRY       FVC 3.55L (101% of predicted).     FEV1 2.96L (105% of predicted).     FEV1/FVC 83%     FEF 25%-75%  3.65L/s (130% of predicted)    The office spirometry performed today doesn't suggest an obstruction.      Asthma Control Test (ACT) total score: 25       ASSESSMENT/PLAN:         Visit Diagnoses     1. Chronic seasonal allergic rhinitis due to pollen    -  Primary  Currently well controlled with Nasonex.  -Continue as is. Reviewed the side effects of use of intranasal steroids include possible glaucoma and or cataracts, and yearly eye exams are recommended.      Relevant Medications    mometasone (NASONEX) 50 MCG/ACT spray    albuterol (PROAIR HFA/PROVENTIL HFA/VENTOLIN HFA) 108 (90 BASE) MCG/ACT Inhaler    2. Asthma, intermittent, uncomplicated     Currently well controlled with albuterol inhaler as needed.  -Continue as is.      Relevant Medications        albuterol (PROAIR HFA/PROVENTIL HFA/VENTOLIN HFA) 108 (90 BASE) MCG/ACT Inhaler    3. Allergic conjunctivitis, bilateral     Currently well controlled with Pataday on as needed basis.  -Continue as is.    Relevant Medications    olopatadine HCl (PATADAY) 0.2 % SOLN    4. Other eczema    Currently well controlled with moisturizers only.  -The patient requests refill for Elocon to be used as needed.  Discussed about potential side effects of long-term use of topical steroids.  The patient knows not to use topical steroids as moisturizer.  Refilled.    Relevant Medications        mometasone (ELOCON) 0.1 % cream          Follow up in 12 months or sooner if needed.    Thank you for allowing us to participate in the care of this patient. Please  feel free to contact us if there are any questions or concerns about the patient.    Disclaimer: This note consists of symbols derived from keyboarding, dictation and/or voice recognition software. As a result, there may be errors in the script that have gone undetected. Please consider this when interpreting information found in this chart.    Maulik Titus MD, [facility]  Allergy, Asthma and Immunology  [place], [place] and [place]      Again, thank you for allowing me to participate in the care of your patient.        Sincerely,        Maulik Titus MD

## 2018-03-26 NOTE — PROGRESS NOTES
SUBJECTIVE:                                                               Bjorn Sykes presents today to our Allergy Clinic at [facility] for a new patient visit.  As you know, she is a 47 year old female with  a history of asthma, eczema and allergic rhinitis.     She was previously a patient of Dr. Josiah Juarez and Dr. Vee.  The patient has a history of allergic rhinoconjunctivitis since age of 17 years. Per Dr. Vee note, skin test in the past was positive for dog, dust mite and mold. Before that birch was positive as well.   In Spring, she is using Nasonex 2 sprays in each nostril twice daily.  Rest of the seasons, she is using Nasonex 2 sprays in each nostril daily.  For her ocular symptoms she is using Pataday as needed.  On that regimen she is pretty well controlled.    She is using Nasonex 2 sprays in each nostril daily, and in Spring BID.  For her ocular symptoms she is using Pataday.  On that regimen she is pretty well controlled.  She never considered allergen immunotherapy.  Asthma symptoms, mainly exercise-induced, started in 00's.  She uses albuterol after exertion only if she develops symptoms since she is not symptomatic all the time.  She has never been hospitalized for chest symptoms.  She is not using albuterol inhaler more than twice a week.  She denies night awakenings.  She was not on prednisone for the last year.    She has been having eczema on her hands, intertriginous areas.  Usually well controlled with moisturizers, but sometimes would require Elocon with good results.      Patient Active Problem List   Diagnosis     Personal history of ovarian cancer     CARDIOVASCULAR SCREENING; LDL GOAL LESS THAN 160     Cornea guttata, mild, ou     Hallux valgus, acquired     Diagnostic skin and sensitization tests(aka ALLERGENS)       Past Medical History:   Diagnosis Date     Adnexal mass 8/2009     Diagnostic skin and sensitization tests 8/98 skin tests per [facility] pos[facility] for  M/T only. 9/00 skin tests [facility] pos. for: dog/DM/M only (T not retested)     Dysmenorrhea     IBU Helps     Intermittent asthma      Migraine      Osteopenia     [facility]     Ovarian carcinoma (H) 8/2009    LEFT side   Dr.Jonson Curt Tabor warts 1990's-2001      Problem (# of Occurrences) Relation (Name,Age of Onset)    Alcohol/Drug (1) Father    Alzheimer Disease (1) Other: aunt    Breast Cancer (1) Other: aunt, maternal    C.A.D. (1) Paternal Grandfather    CEREBROVASCULAR DISEASE (1) Paternal Grandfather    Genetic Disorder (1) Sister (1): Angelman's syndrome    Hypertension (1) Paternal Grandfather    Thyroid Disease (2) Paternal Grandmother, Sister       Negative family history of: Macular Degeneration, Glaucoma        Past Surgical History:   Procedure Laterality Date     APPENDECTOMY OPEN  8/13/2009    see above     BUNIONECTOMY  10/16/2012    Procedure: BUNIONECTOMY;  Right Theo bunionectomy;  Surgeon: Daniel Arias MD;  Location: [facility] OR     D & C  8/2007    cysts in uterus     HYSTERECTOMY TOTAL ABDOMINAL, BILATERAL SALPINGO-OOPHORECTOMY, COMBINED  8/13/2009    appy also     HYSTERECTOMY, PAP NO LONGER INDICATED       Social History     Social History     Marital status:      Spouse name: N/A     Number of children: N/A     Years of education: N/A     Occupational History     pharmacist      Social History Main Topics     Smoking status: Never Smoker     Smokeless tobacco: Never Used     Alcohol use Yes      Comment: occasional     Drug use: No     Sexual activity: Yes     Partners: Male     Birth control/ protection: Surgical     Other Topics Concern     None     Social History Narrative    March 26, 2018    ENVIRONMENTAL HISTORY: [place] The home is heated with a forced air. They do have central air conditioning. The patient's bedroom is furnished with hard jorge in bedroom, allergen mattress cover and allergen pillowcase cover.  No pets inside the house. There is no  history of cockroach or mice infestation. There are no smokers in the house.  The house does have a damp basement, use a dehumidifier in the summer.    1961           Review of Systems   Constitutional: Positive for fatigue. Negative for activity change, chills and fever.   HENT: Positive for postnasal drip, rhinorrhea and sneezing. Negative for congestion, dental problem, ear pain, facial swelling, nosebleeds and sinus pressure.    Eyes: Negative for discharge, redness and itching.   Respiratory: Negative for cough, chest tightness, shortness of breath and wheezing.    Cardiovascular: Negative for chest pain.   Gastrointestinal: Negative for diarrhea, nausea and vomiting.   Musculoskeletal: Negative for arthralgias, joint swelling and myalgias.   Skin: Positive for rash (on hands occ.).   Neurological: Negative for headaches.   Hematological: Negative for adenopathy.   Psychiatric/Behavioral: Negative for behavioral problems and self-injury.           Current Outpatient Prescriptions:      olopatadine HCl (PATADAY) 0.2 % SOLN, Place 1 drop into both eyes daily, Disp: 1 Bottle, Rfl: 11     mometasone (NASONEX) 50 MCG/ACT spray, Spray 2 sprays into both nostrils daily, Disp: 3 Box, Rfl: 3     mometasone (ELOCON) 0.1 % cream, Apply topically daily as needed Apply 1 dose topically daily as needed, Disp: 45 g, Rfl: 3     albuterol (PROAIR HFA/PROVENTIL HFA/VENTOLIN HFA) 108 (90 BASE) MCG/ACT Inhaler, Inhale 2 puffs into the lungs every 4 hours as needed for shortness of breath / dyspnea, Disp: 1 Inhaler, Rfl: 2     Calcium Carbonate-Vitamin D (CALCIUM 500 + D PO), Take by mouth 2 times daily, Disp: , Rfl:      Glucosamine-Chondroit-Vit C-Mn (GLUCOSAMINE CHONDR 1500 COMPLX PO), Take  by mouth 2 times daily., Disp: , Rfl:      ascorbic acid (VITAMIN C) 500 MG tablet, Take 500 mg by mouth as needed., Disp: , Rfl:      IBUPROFEN PO, Take  by mouth as needed., Disp: , Rfl:       MULTIVITAMIN OR, once daily, Disp: , Rfl:      [DISCONTINUED] albuterol (PROAIR HFA/PROVENTIL HFA/VENTOLIN HFA) 108 (90 BASE) MCG/ACT Inhaler, Inhale 2 puffs into the lungs every 4 hours as needed for shortness of breath / dyspnea, Disp: 1 Inhaler, Rfl: 2     cetirizine (ZYRTEC) 10 MG tablet, Take 1 tablet (10 mg) by mouth daily (Patient not taking: Reported on 3/26/2018), Disp: 90 tablet, Rfl: 3  Immunization History   Administered Date(s) Administered     Influenza Intranasal Vaccine 10/20/2015     TD (ADULT, 7+) 01/15/2006, 07/31/2007     Allergies   Allergen Reactions     Flagyl [Imidazole Antifungals] Rash     Sulfa Drugs Rash     OBJECTIVE:                                                                 /86 (BP Location: Left arm, Patient Position: Sitting, Cuff Size: Adult Regular)  Pulse 64  Temp 97.1  F (36.2  C) (Oral)  Resp 16  Wt 73.7 kg (162 lb 7.7 oz)  LMP 07/05/2009  SpO2 100%  BMI 27.46 kg/m2        Physical Exam   Constitutional: No distress.   HENT:   Head: Normocephalic and atraumatic.   Right Ear: Tympanic membrane, external ear and ear canal normal.   Left Ear: Tympanic membrane, external ear and ear canal normal.   Nose: No mucosal edema or rhinorrhea.   Mouth/Throat: Oropharynx is clear and moist and mucous membranes are normal.   Eyes: Conjunctivae are normal. Right eye exhibits no discharge. Left eye exhibits no discharge.   Neck: Normal range of motion.   Cardiovascular: Normal rate, regular rhythm and normal heart sounds.    No murmur heard.  Pulmonary/Chest: Effort normal and breath sounds normal. No respiratory distress. She has no wheezes. She has no rales.   Musculoskeletal: Normal range of motion.   Lymphadenopathy:     She has no cervical adenopathy.   Neurological: She is alert.   Skin: Skin is warm. She is not diaphoretic.   Mild xerosis of the skin in intertriginous areas of both hands.  No active dermatitis noted.   Psychiatric: Affect normal.   Nursing note and  vitals reviewed.            WORKUP:   SPIROMETRY       FVC 3.55L (101% of predicted).     FEV1 2.96L (105% of predicted).     FEV1/FVC 83%     FEF 25%-75%  3.65L/s (130% of predicted)    The office spirometry performed today doesn't suggest an obstruction.      Asthma Control Test (ACT) total score: 25       ASSESSMENT/PLAN:         Visit Diagnoses     1. Chronic seasonal allergic rhinitis due to pollen    -  Primary  Currently well controlled with Nasonex.  -Continue as is. Reviewed the side effects of use of intranasal steroids include possible glaucoma and or cataracts, and yearly eye exams are recommended.      Relevant Medications    mometasone (NASONEX) 50 MCG/ACT spray    albuterol (PROAIR HFA/PROVENTIL HFA/VENTOLIN HFA) 108 (90 BASE) MCG/ACT Inhaler    2. Asthma, intermittent, uncomplicated     Currently well controlled with albuterol inhaler as needed.  -Continue as is.      Relevant Medications        albuterol (PROAIR HFA/PROVENTIL HFA/VENTOLIN HFA) 108 (90 BASE) MCG/ACT Inhaler    3. Allergic conjunctivitis, bilateral     Currently well controlled with Pataday on as needed basis.  -Continue as is.    Relevant Medications    olopatadine HCl (PATADAY) 0.2 % SOLN    4. Other eczema    Currently well controlled with moisturizers only.  -The patient requests refill for Elocon to be used as needed.  Discussed about potential side effects of long-term use of topical steroids.  The patient knows not to use topical steroids as moisturizer.  Refilled.    Relevant Medications        mometasone (ELOCON) 0.1 % cream          Follow up in 12 months or sooner if needed.    Thank you for allowing us to participate in the care of this patient. Please feel free to contact us if there are any questions or concerns about the patient.    Disclaimer: This note consists of symbols derived from keyboarding, dictation and/or voice recognition software. As a result, there may be errors in the script that have gone undetected.  Please consider this when interpreting information found in this chart.    Maulik Titus MD, FACAAI  Allergy, Asthma and Immunology  [place], [place] and [place]

## 2018-03-27 ENCOUNTER — TELEPHONE (OUTPATIENT)
Dept: ALLERGY | Facility: CLINIC | Age: 48
End: 2018-03-27

## 2018-03-27 ASSESSMENT — ASTHMA QUESTIONNAIRES: ACT_TOTALSCORE: 25

## 2018-03-27 NOTE — TELEPHONE ENCOUNTER
Ventolin HFA not covered by ins plan, preferred alternative is:  Levalbuterolaeract, proairhfaaer, proairprespiaer        Prior Authorization Retail Medication Request    Medication/Dose: albuterol 108 (90 base)  ICD code (if different than what is on RX):  Asthma, intermittent, uncomplicated [J45.20]   Previously Tried and Failed:    Rationale:      Insurance Name:  Preferred one  Insurance ID:  [id number]      Pharmacy Information (if different than what is on RX)  [facility]  Phone:  [phone]

## 2018-03-27 NOTE — TELEPHONE ENCOUNTER
Called and spoke with [facility] pharmacy. States this request was sent in error. No PA required.   Closing encounter.  Urmila TRINIDAD RN

## 2018-03-27 NOTE — TELEPHONE ENCOUNTER
I am not sure why I am receiving this message.  Albuterol was prescribed but in parenthesis comment says ProAir, Proventil, or Ventolin.  The pharmacy can decide which is covered by her insurance.  Maulik Gonzalez

## 2018-04-11 ENCOUNTER — OFFICE VISIT (OUTPATIENT)
Dept: OBGYN | Facility: CLINIC | Age: 48
End: 2018-04-11
Payer: COMMERCIAL

## 2018-04-11 ENCOUNTER — RADIANT APPOINTMENT (OUTPATIENT)
Dept: MAMMOGRAPHY | Facility: CLINIC | Age: 48
End: 2018-04-11
Attending: OBSTETRICS & GYNECOLOGY
Payer: COMMERCIAL

## 2018-04-11 VITALS
SYSTOLIC BLOOD PRESSURE: 128 MMHG | OXYGEN SATURATION: 94 % | HEIGHT: 65 IN | HEART RATE: 73 BPM | DIASTOLIC BLOOD PRESSURE: 85 MMHG | BODY MASS INDEX: 26.09 KG/M2 | WEIGHT: 156.6 LBS

## 2018-04-11 DIAGNOSIS — Z12.31 VISIT FOR SCREENING MAMMOGRAM: ICD-10-CM

## 2018-04-11 DIAGNOSIS — Z12.4 PAP SMEAR FOR CERVICAL CANCER SCREENING: ICD-10-CM

## 2018-04-11 DIAGNOSIS — Z85.43 PERSONAL HISTORY OF OVARIAN CANCER: Primary | ICD-10-CM

## 2018-04-11 DIAGNOSIS — Z01.419 ENCOUNTER FOR GYNECOLOGICAL EXAMINATION WITHOUT ABNORMAL FINDING: ICD-10-CM

## 2018-04-11 LAB — CANCER AG125 SERPL-ACNC: <5 U/ML (ref 0–30)

## 2018-04-11 PROCEDURE — 99396 PREV VISIT EST AGE 40-64: CPT | Performed by: OBSTETRICS & GYNECOLOGY

## 2018-04-11 PROCEDURE — 36415 COLL VENOUS BLD VENIPUNCTURE: CPT | Performed by: OBSTETRICS & GYNECOLOGY

## 2018-04-11 PROCEDURE — 77067 SCR MAMMO BI INCL CAD: CPT | Mod: TC

## 2018-04-11 PROCEDURE — 86304 IMMUNOASSAY TUMOR CA 125: CPT | Performed by: OBSTETRICS & GYNECOLOGY

## 2018-04-11 NOTE — NURSING NOTE
"Chief Complaint   Patient presents with     Physical     Annual Female       Initial /85 (BP Location: Right arm, Cuff Size: Adult Regular)  Pulse 73  Ht 5' 4.5\" (1.638 m)  Wt 156 lb 9.6 oz (71 kg)  LMP 07/05/2009  SpO2 94%  BMI 26.47 kg/m2 Estimated body mass index is 26.47 kg/(m^2) as calculated from the following:    Height as of this encounter: 5' 4.5\" (1.638 m).    Weight as of this encounter: 156 lb 9.6 oz (71 kg).  Medication Reconciliation: complete   WALDO Gage 4/11/2018         "

## 2018-04-11 NOTE — MR AVS SNAPSHOT
"              After Visit Summary   4/11/2018    Bjorn Sykes    MRN: [id number]           Patient Information     Date Of Birth          1970        Visit Information        Provider Department      4/11/2018 1:00 PM Lalo [facility] MD [facility]        Today's Diagnoses     Personal history of ovarian cancer    -  1    Encounter for gynecological examination without abnormal finding        Pap smear for cervical cancer screening           Follow-ups after your visit        Who to contact     If you have questions or need follow up information about today's clinic visit or your schedule please contact [facility] directly at [phone].  Normal or non-critical lab and imaging results will be communicated to you by [employer], letter or phone within 4 business days after the clinic has received the results. If you do not hear from us within 7 days, please contact the clinic through [employer] or phone. If you have a critical or abnormal lab result, we will notify you by phone as soon as possible.  Submit refill requests through [employer] or call your pharmacy and they will forward the refill request to us. Please allow 3 business days for your refill to be completed.          Additional Information About Your Visit        MyChart Information     [employer] gives you secure access to your electronic health record. If you see a primary care provider, you can also send messages to your care team and make appointments. If you have questions, please call your primary care clinic.  If you do not have a primary care provider, please call [phone] and they will assist you.        Care EveryWhere ID     This is your Care EveryWhere ID. This could be used by other organizations to access your [facility] medical records  [id number]        Your Vitals Were     Pulse Height Last Period Pulse Oximetry BMI (Body Mass Index)       73 5' 4.5\" (1.638 m) 07/05/2009 94% 26.47 kg/m2        Blood " Pressure from Last 3 Encounters:   04/11/18 128/85   03/26/18 132/86   02/05/18 126/78    Weight from Last 3 Encounters:   04/11/18 156 lb 9.6 oz (71 kg)   03/26/18 162 lb 7.7 oz (73.7 kg)   02/05/18 163 lb (73.9 kg)              We Performed the Following             Primary Care Provider Office Phone # Fax #    Lalo Denzel Ron [phone] [address] [facility] [address]        Equal Access to Services     [facility]: Hadii aad ku hadasho Soomaali, waaxda luqadaha, qaybta kaalmada adepratibhayada, whitley maldonado . So [facility] [phone].    ATENCIÓN: Si habla español, tiene a simmons disposición servicios gratuitos de asistencia lingüística. [facility] [phone].    We comply with applicable federal civil rights laws and [place] laws. We do not discriminate on the basis of race, color, national origin, age, disability, sex, sexual orientation, or gender identity.            Thank you!     Thank you for choosing [facility]  for your care. Our goal is always to provide you with excellent care. Hearing back from our patients is one way we can continue to improve our services. Please take a few minutes to complete the written survey that you may receive in the mail after your visit with us. Thank you!             Your Updated Medication List - Protect others around you: Learn how to safely use, store and throw away your medicines at www.disposemymeds.org.          This list is accurate as of 4/11/18  1:59 PM.  Always use your most recent med list.                   Brand Name Dispense Instructions for use Diagnosis    albuterol 108 (90 Base) MCG/ACT Inhaler    PROAIR HFA/PROVENTIL HFA/VENTOLIN HFA    1 Inhaler    Inhale 2 puffs into the lungs every 4 hours as needed for shortness of breath / dyspnea    Asthma, intermittent, uncomplicated       ascorbic acid 500 MG tablet    VITAMIN C     Take 500 mg by mouth as needed.        CALCIUM 500 + D  PO      Take by mouth 2 times daily        cetirizine 10 MG tablet    zyrTEC    90 tablet    Take 1 tablet (10 mg) by mouth daily    Dermatitis       GLUCOSAMINE CHONDR 1500 COMPLX PO      Take  by mouth 2 times daily.        IBUPROFEN PO      Take  by mouth as needed.        mometasone 0.1 % cream    ELOCON    45 g    Apply topically daily as needed Apply 1 dose topically daily as needed    Other eczema       mometasone 50 MCG/ACT spray    NASONEX    3 Box    Spray 2 sprays into both nostrils daily    Asthma, intermittent, uncomplicated       MULTIVITAMIN PO      once daily    Pelvic mass       olopatadine HCl 0.2 % Soln    PATADAY    1 Bottle    Place 1 drop into both eyes daily    Allergic conjunctivitis, bilateral

## 2018-04-11 NOTE — PROGRESS NOTES
Bjorn Sykes is a 47 year old female , who presents for annual exam.   No unusual bleeding, no incontinence, or unusual discharge.     Last cholesterol:   Recent Labs   Lab Test  16   0818  12/06/10   0939   CHOL  222*  199   HDL  76  69   LDL  133*  117   TRIG  63  70   CHOLHDLRATIO   --   2.9     Past Medical History:   Diagnosis Date     Adnexal mass 2009     Diagnostic skin and sensitization tests  skin tests per [facility] pos. for M/T only.  skin tests [facility] pos. for: dog/DM/M only (T not retested)     Dysmenorrhea     IBU Helps     Intermittent asthma      Migraine      Osteopenia     [facility]     Ovarian carcinoma (H) 2009    LEFT side   Dr.Jonson Curt PANIAGUA     Plantar warts s-       Past Surgical History:   Procedure Laterality Date     APPENDECTOMY OPEN  2009    see above     BUNIONECTOMY  10/16/2012    Procedure: BUNIONECTOMY;  Right Theo bunionectomy;  Surgeon: Daniel Arias MD;  Location: [facility] OR     D & C  2007    cysts in uterus     HYSTERECTOMY TOTAL ABDOMINAL, BILATERAL SALPINGO-OOPHORECTOMY, COMBINED  2009    appy also     HYSTERECTOMY, PAP NO LONGER INDICATED         Obstetric History       T0      L0     SAB0   TAB0   Ectopic0   Multiple0   Live Births0           Gyn History:  Gynecological History         Patient's last menstrual period was 2009.     no STD/no PID/no IUD      history of abnormal pap smear:  no  Last pap:   Lab Results   Component Value Date    PAP NIL 2016           Current Outpatient Prescriptions   Medication Sig Dispense Refill     olopatadine HCl (PATADAY) 0.2 % SOLN Place 1 drop into both eyes daily 1 Bottle 11     mometasone (NASONEX) 50 MCG/ACT spray Spray 2 sprays into both nostrils daily 3 Box 3     mometasone (ELOCON) 0.1 % cream Apply topically daily as needed Apply 1 dose topically daily as needed 45 g 3     cetirizine (ZYRTEC) 10 MG tablet Take 1 tablet (10 mg) by mouth daily 90 tablet 3      Calcium Carbonate-Vitamin D (CALCIUM 500 + D PO) Take by mouth 2 times daily       Glucosamine-Chondroit-Vit C-Mn (GLUCOSAMINE CHONDR 1500 COMPLX PO) Take  by mouth 2 times daily.       ascorbic acid (VITAMIN C) 500 MG tablet Take 500 mg by mouth as needed.       MULTIVITAMIN OR once daily       albuterol (PROAIR HFA/PROVENTIL HFA/VENTOLIN HFA) 108 (90 BASE) MCG/ACT Inhaler Inhale 2 puffs into the lungs every 4 hours as needed for shortness of breath / dyspnea 1 Inhaler 2     IBUPROFEN PO Take  by mouth as needed.         Allergies   Allergen Reactions     Flagyl [Imidazole Antifungals] Rash     Sulfa Drugs Rash       Social History     Social History     Marital status:      Spouse name: N/A     Number of children: N/A     Years of education: N/A     Occupational History     pharmacist      Social History Main Topics     Smoking status: Never Smoker     Smokeless tobacco: Never Used     Alcohol use Yes      Comment: occasional     Drug use: No     Sexual activity: Yes     Partners: Male     Birth control/ protection: Surgical     Other Topics Concern     Not on file     Social History Narrative    March 26, 2018    ENVIRONMENTAL HISTORY: [place] The home is heated with a forced air. They do have central air conditioning. The patient's bedroom is furnished with hard jorge in bedroom, allergen mattress cover and allergen pillowcase cover. No pets inside the house. There is no  history of cockroach or mice infestation. There are no smokers in the house.  The house does have a damp basement, use a dehumidifier in the summer.    1961       Family History   Problem Relation Age of Onset     Alcohol/Drug Father      C.A.D. Paternal Grandfather      Hypertension Paternal Grandfather      CEREBROVASCULAR DISEASE Paternal Grandfather      Genetic Disorder Sister      Angelman's syndrome     Thyroid Disease Paternal Grandmother      Breast Cancer Other      aunt,  "maternal     Alzheimer Disease Other      aunt     Thyroid Disease Sister      Macular Degeneration No family hx of      Glaucoma No family hx of          ROS:  All negative except as above.      EXAM:  /85 (BP Location: Right arm, Cuff Size: Adult Regular)  Pulse 73  Ht 5' 4.5\" (1.638 m)  Wt 156 lb 9.6 oz (71 kg)  LMP 07/05/2009  SpO2 94%  BMI 26.47 kg/m2  General:  WNWD female, NAD  Alert  Oriented x 3  Gait:  Normal  Skin:  Normal skin turgor  Neurologic:  CN grossly intact, good sensation.    HEENT:  NC/AT, EOMI  Neck:  No masses palpated, symmetrical, carotids +2/4, no bruits heard  Heart:  RRR  Lungs:  Clear   Breasts:  Symmetrical, no dimpling noted, no masses palpated, no discharge expressed  Abdomen:  Non-tender, non-distended.  Vulva: No external lesions, normal hair distribution, no adenopathy  BUS:  Normal, no masses noted  Urethra:  No hypermobility noted  Urethral meatus:  No masses noted  Vagina: Moist, pink, no abnormal discharge, well rugated, no lesions  Cervix: Absent   Uterus: Absent   Bimanual:  No masses palpated  Perianal:  No masses noted.   Rectal exam: No mass, non-tender, normal sphincter tone  Recto/Vaginal exam:  No masses palpated. Confirms above   Extremities:  No clubbing, cyanosis, or edema      ASSESSMENT/PLAN   Annual examination    ordered for personal history of ovarian ca.   She might have me refill the Nasonex in the future.  She will not return to allergy at [facility].   Low fat diet, weight bearing exercises and self breast exams on a monthly basis have been recommended.  I have discussed with patient the risks, benefits, medications, treatment options and modalities.   I have instructed the patient to call or schedule a follow-up appointment if any problems.    "

## 2018-07-03 ENCOUNTER — THERAPY VISIT (OUTPATIENT)
Dept: PHYSICAL THERAPY | Facility: CLINIC | Age: 48
End: 2018-07-03
Payer: COMMERCIAL

## 2018-07-03 ENCOUNTER — OFFICE VISIT (OUTPATIENT)
Dept: ORTHOPEDICS | Facility: CLINIC | Age: 48
End: 2018-07-03
Payer: COMMERCIAL

## 2018-07-03 ENCOUNTER — RADIANT APPOINTMENT (OUTPATIENT)
Dept: GENERAL RADIOLOGY | Facility: CLINIC | Age: 48
End: 2018-07-03
Attending: PEDIATRICS
Payer: COMMERCIAL

## 2018-07-03 VITALS
SYSTOLIC BLOOD PRESSURE: 122 MMHG | WEIGHT: 161 LBS | BODY MASS INDEX: 26.82 KG/M2 | HEIGHT: 65 IN | DIASTOLIC BLOOD PRESSURE: 78 MMHG

## 2018-07-03 DIAGNOSIS — M25.561 CHRONIC PAIN OF RIGHT KNEE: ICD-10-CM

## 2018-07-03 DIAGNOSIS — M22.2X1 PATELLOFEMORAL SYNDROME OF BOTH KNEES: ICD-10-CM

## 2018-07-03 DIAGNOSIS — M25.561 PAIN IN BOTH KNEES, UNSPECIFIED CHRONICITY: Primary | ICD-10-CM

## 2018-07-03 DIAGNOSIS — G89.29 CHRONIC PAIN OF LEFT KNEE: Primary | ICD-10-CM

## 2018-07-03 DIAGNOSIS — M25.562 PAIN IN BOTH KNEES, UNSPECIFIED CHRONICITY: ICD-10-CM

## 2018-07-03 DIAGNOSIS — M25.562 PAIN IN BOTH KNEES, UNSPECIFIED CHRONICITY: Primary | ICD-10-CM

## 2018-07-03 DIAGNOSIS — M25.561 PAIN IN BOTH KNEES, UNSPECIFIED CHRONICITY: ICD-10-CM

## 2018-07-03 DIAGNOSIS — M25.562 CHRONIC PAIN OF LEFT KNEE: Primary | ICD-10-CM

## 2018-07-03 DIAGNOSIS — M22.2X2 PATELLOFEMORAL SYNDROME OF BOTH KNEES: ICD-10-CM

## 2018-07-03 DIAGNOSIS — G89.29 CHRONIC PAIN OF RIGHT KNEE: ICD-10-CM

## 2018-07-03 PROCEDURE — 97110 THERAPEUTIC EXERCISES: CPT | Mod: GP | Performed by: PHYSICAL THERAPIST

## 2018-07-03 PROCEDURE — 73564 X-RAY EXAM KNEE 4 OR MORE: CPT | Mod: RT | Performed by: PEDIATRICS

## 2018-07-03 PROCEDURE — 99203 OFFICE O/P NEW LOW 30 MIN: CPT | Performed by: PEDIATRICS

## 2018-07-03 PROCEDURE — 97161 PT EVAL LOW COMPLEX 20 MIN: CPT | Mod: GP | Performed by: PHYSICAL THERAPIST

## 2018-07-03 ASSESSMENT — ACTIVITIES OF DAILY LIVING (ADL)
GO DOWN STAIRS: ACTIVITY IS NOT DIFFICULT
KNEE_ACTIVITY_OF_DAILY_LIVING_SUM: 67
SIT WITH YOUR KNEE BENT: ACTIVITY IS NOT DIFFICULT
KNEE_ACTIVITY_OF_DAILY_LIVING_SCORE: 95.71
LIMPING: I DO NOT HAVE THE SYMPTOM
SQUAT: ACTIVITY IS NOT DIFFICULT
AS_A_RESULT_OF_YOUR_KNEE_INJURY,_HOW_WOULD_YOU_RATE_YOUR_CURRENT_LEVEL_OF_DAILY_ACTIVITY?: NORMAL
GIVING WAY, BUCKLING OR SHIFTING OF KNEE: THE SYMPTOM AFFECTS MY ACTIVITY SLIGHTLY
HOW_WOULD_YOU_RATE_THE_CURRENT_FUNCTION_OF_YOUR_KNEE_DURING_YOUR_USUAL_DAILY_ACTIVITIES_ON_A_SCALE_FROM_0_TO_100_WITH_100_BEING_YOUR_LEVEL_OF_KNEE_FUNCTION_PRIOR_TO_YOUR_INJURY_AND_0_BEING_THE_INABILITY_TO_PERFORM_ANY_OF_YOUR_USUAL_DAILY_ACTIVITIES?: 100
SWELLING: I DO NOT HAVE THE SYMPTOM
PAIN: I HAVE THE SYMPTOM BUT IT DOES NOT AFFECT MY ACTIVITY
HOW_WOULD_YOU_RATE_THE_OVERALL_FUNCTION_OF_YOUR_KNEE_DURING_YOUR_USUAL_DAILY_ACTIVITIES?: NORMAL
STAND: ACTIVITY IS NOT DIFFICULT
KNEEL ON THE FRONT OF YOUR KNEE: ACTIVITY IS NOT DIFFICULT
RISE FROM A CHAIR: ACTIVITY IS NOT DIFFICULT
STIFFNESS: I DO NOT HAVE THE SYMPTOM
WEAKNESS: I DO NOT HAVE THE SYMPTOM
WALK: ACTIVITY IS NOT DIFFICULT
RAW_SCORE: 67
GO UP STAIRS: ACTIVITY IS NOT DIFFICULT

## 2018-07-03 NOTE — PROGRESS NOTES
"[facility] Initial Evaluation  Subjective:  Patient is a 48 year old female presenting with rehab right knee hpi. The history is provided by the patient. No  was used.   Bjorn Sykes is a 48 year old female with a bilateral knees (R worse than running) condition.  Condition occurred with:  Insidious onset.  Condition occurred: during recreation/sport.  This is a chronic condition  Pt states had pain while training for half marathon in February 2018.  Since then has been running once to twice per week with some discomfort, although was better with brace on R knee.  Noticed was missing running and so sought medical intervention.  Referred to PT 07/03/2018.    Patient reports pain:  Lateral and medial.    Quality: \"sharp and achy\" and is intermittent and reported as 3/10.   Worse during: activity dependent, not time dependent (although is usually a morning runner)  Exacerbated by: during running, prolonged standing. Relieved by: not running, bracing.    Special testing: xrays today as outlined in Dr Zuniga' note.      General health as reported by patient is good.  Pertinent medical history includes:  Cancer.  Medical allergies: yes (see chart).  Other surgeries include:  Cancer surgery (ovarian).  Medication history: allergy nasal spray.  Current occupation is pharmacist.  Patient is working in normal job without restrictions.  Primary job tasks include:  Driving and prolonged standing.    Barriers include:  None as reported by the patient.    Red flags:  None as reported by the patient.    Pt is signed up for 10K next February and possibly some 5Ks between now and then.  She states would ordinarily run Fridays, Saturdays, and Sundays two to three miles at a time.                    Objective:  Standing Alignment:              Knee:  Normal  Ankle/Foot:  Pes planus R and pes planus L    Gait:    Gait Type:  Normal                    Lumbar/SI Evaluation  ROM:  AROM " Lumbar: normal                                                          Hip Evaluation  Hip PROM:  Hip PROM:  Left Hip:    Normal  Right Hip:  Normal                          Hip Strength:    Flexion:   Left: 5/5   -  Pain:  Right: 5/5   -  Pain:                    Extension:  Left: 4/5  -  Pain:Right: 4/5    -  Pain:    Abduction:  Left: 4/5    -   Pain:Right: 4/5   -   Pain:  Adduction:  Left: 5/5   -   Pain:Right: 5/5   -  Pain:                         Knee Evaluation:  ROM:    AROM      Extension:  Left: 2    Right:  1  Flexion: Left: 125    Right: 128    Pain: no discomfort knee AROM B    Strength:     Extension:  Left: 5/5    Pain:-      Right: 5/5    Pain:-  Flexion:  Left: 5-/5    Pain:-      Right: 5-/5    Pain:-    Quad Set Left:  Good    Pain: -   Quad Set Right:  Good    Pain: -  Ligament Testing:    Varus 0:  Left:  Neg   Right:  Neg  Varus 30:  Left:  Neg  Right:  Neg  Valgus 0:  Left:  Neg  Right:  Neg  Valgus 30:  Left:  Neg    Right:  Neg    Posterior Drawer: Left:  Neg  Right:  Neg  Lachman's:  Left:  Neg  Right:  Neg    Palpation:    Left knee tenderness present at:  Medial Joint Line  Left knee tenderness not present at:  Lateral Joint Line; Patellar Tendon; IT Band; Popliteal; Patellar Medial; Patellar Lateral; Patellar Superior and Patellar Inferior  Right knee tenderness present at:  Medial Joint Line and Lateral Joint Line  Right knee tenderness not present at:  Patellar Tendon; IT Band; Popliteal; Patellar Medial; Patellar Lateral; Patellar Superior and Patellar Inferior        Valgus deviation with SLS squat B.    General     ROS    Assessment/Plan:    Patient is a 48 year old female with both sides knee complaints.    Patient has the following significant findings with corresponding treatment plan.                Diagnosis 1:  R > L knee pain  Pain -  hot/cold therapy  Decreased strength - therapeutic exercise and therapeutic activities  Impaired muscle performance - neuro  re-education  Decreased function - therapeutic activities  Impaired posture - neuro re-education  Anticipate look at running biomechanics next session.    Therapy Evaluation Codes:   1) History comprised of:   Personal factors that impact the plan of care:      Overall behavior pattern and Time since onset of symptoms.    Comorbidity factors that impact the plan of care are:      None.     Medications impacting care: None.  2) Examination of Body Systems comprised of:   Body structures and functions that impact the plan of care:      Hip and Knee.   Activity limitations that impact the plan of care are:      Running and Standing.  3) Clinical presentation characteristics are:   Stable/Uncomplicated.  4) Decision-Making    Low complexity using standardized patient assessment instrument and/or measureable assessment of functional outcome.  Cumulative Therapy Evaluation is: Low complexity.    Previous and current functional limitations:  (See Goal Flow Sheet for this information)    Short term and Long term goals: (See Goal Flow Sheet for this information)     Communication ability:  Patient appears to be able to clearly communicate and understand verbal and written communication and follow directions correctly.  Treatment Explanation - The following has been discussed with the patient:   RX ordered/plan of care  Anticipated outcomes  Possible risks and side effects  This patient would benefit from PT intervention to resume normal activities.   Rehab potential is good.    Frequency:  2 X week, once daily  Duration:  for 1 weeks tapering to 1 X a week over 2 weeks  Discharge Plan:  Achieve all LTG.  Independent in home treatment program.  Reach maximal therapeutic benefit.    Please refer to the daily flowsheet for treatment today, total treatment time and time spent performing 1:1 timed codes.

## 2018-07-03 NOTE — LETTER
7/3/2018         RE: Bjorn Sykes  [address] [place] [address]        Dear Colleague,    Thank you for referring your patient, Bjorn Sykes, to the [facility] [place]. Please see a copy of my visit note below.    Sports Medicine Clinic Visit    PCP: Lalo Ron    Bjorn Sykes is a 48 year old female who is seen  as a self referral presenting with bilateral knee pain.  Patient did run a marathon in [facility] and has had knee pain prior to the race.  Pain is worse in the right.  Pain is mostly anterior.  Currently not having any pain.  Has continued to run.  Pain can be intermittent with running.  Running about 2-3 miles a few times a week currently.  Does have improvement with the use of a knee brace.     **  Pain in the bilateral knees is in the anterior knee and along the lateral and medial aspects (R>L). She does have clicking in the right knee with extension. No swelling noted. Patient uses a compression brace for the right knee which gives relief.     Injury: running     Location of Pain: bilateral anterior knee pain   Duration of Pain: 6+ month(s)  Rating of Pain at worst: 3/10  Rating of Pain Currently: 0/10  Symptoms are better with: Rest  Symptoms are worse with: running, inclines, twisting   Additional Features:   Positive: grinding   Negative: swelling, bruising, popping, catching, locking, instability, paresthesias, numbness, weakness, pain in other joints and systemic symptoms  Other evaluation and/or treatments so far consists of: Nothing  Prior History of related problems: denies     Social History: pharmacist     Review of Systems  Musculoskeletal: as above  Remainder of review of systems is negative including constitutional, CV, pulmonary, GI, Skin and Neurologic except as noted in HPI or medical history.    This document serves as a record of the services and decisions personally performed and made by Stan Zuniga DO, CAQ.  It was created on his behalf by Shola Martinez, a trained medical scribe. The creation of this document is based the provider's statements to the medical scribe.  Shola Martinez July 3, 2018, 9:29 AM      Past Medical History:   Diagnosis Date     Adnexal mass 8/2009     Diagnostic skin and sensitization tests 8/98 skin tests per [facility] pos. for M/T only. 9/00 skin tests [facility] pos. for: dog/DM/M only (T not retested)     Dysmenorrhea     IB[facility] Helps     Intermittent asthma      Migraine      Osteopenia     [facility]     Ovarian carcinoma (H) 8/2009    LEFT side   Dr.Jonson Curt Tabor warts 1990's-2001     Past Surgical History:   Procedure Laterality Date     APPENDECTOMY OPEN  8/13/2009    see above     BUNIONECTOMY  10/16/2012    Procedure: BUNIONECTOMY;  Right Theo bunionectomy;  Surgeon: Daniel Arias MD;  Location: [facility] OR     D & C  8/2007    cysts in uterus     HYSTERECTOMY TOTAL ABDOMINAL, BILATERAL SALPINGO-OOPHORECTOMY, COMBINED  8/13/2009    appy also     HYSTERECTOMY, PAP NO LONGER INDICATED       Family History   Problem Relation Age of Onset     Alcohol/Drug Father      C.A.D. Paternal Grandfather      Hypertension Paternal Grandfather      Cerebrovascular Disease Paternal Grandfather      Genetic Disorder Sister      Angelman's syndrome     Thyroid Disease Paternal Grandmother      Breast Cancer Other      aunt, maternal     Alzheimer Disease Other      aunt     Thyroid Disease Sister      Macular Degeneration No family hx of      Glaucoma No family hx of      Social History     Social History     Marital status:      Spouse name: N/A     Number of children: N/A     Years of education: N/A     Occupational History     pharmacist      Social History Main Topics     Smoking status: Never Smoker     Smokeless tobacco: Never Used     Alcohol use Yes      Comment: occasional     Drug use: No     Sexual activity: Yes     Partners: Male     Birth control/ protection: Surgical  "    Other Topics Concern     Not on file     Social History Narrative    March 26, 2018    ENVIRONMENTAL HISTORY: [place] The home is heated with a forced air. They do have central air conditioning. The patient's bedroom is furnished with hard jorge in bedroom, allergen mattress cover and allergen pillowcase cover. No pets inside the house. There is no  history of cockroach or mice infestation. There are no smokers in the house.  The house does have a damp basement, use a dehumidifier in the summer.    1961       Objective  /78  Ht 5' 4.5\" (1.638 m)  Wt 161 lb (73 kg)  LMP 07/05/2009  BMI 27.21 kg/m2    GENERAL APPEARANCE: healthy, alert and no distress   GAIT: NORMAL  SKIN: no suspicious lesions or rashes  NEURO: Normal strength and tone, mentation intact and speech normal  PSYCH:  mentation appears normal and affect normal/bright  HEENT: no scleral icterus  CV: no extremity edema   RESP: nonlabored breathing     Bilateral Knee exam    ROM:        Flexion full, symmetric, no pain       Extension full, symmetric, no pain     Inspection:       no visible ecchymosis       no visible edema or effusion    Skin:       no visible deformities       well perfused       capillary refill brisk    Patellar Motion:        Crepitus noted in the patellofemoral joint, minimal bilaterally        No pain with patellar translation bilaterally     Tender:        Lateral joint line bilaterally       Medial joint line bilaterally      Non Tender:         remainder of knee area    Special Tests:   Right Knee:       neg (-) Malik, no pain        neg (-) Lachman       neg (-) anterior drawer       neg (-) posterior drawer       neg (-) varus, no pain        neg (-) valgus, no pain        no pain with forced extension    Left Knee:       neg (-) Malik, no pain        neg (-) Lachman       neg (-) anterior drawer       neg (-) posterior drawer       neg (-) varus, no pain "        neg (-) valgus, no pain        no pain with forced extension    Evaluation of ipsilateral kinetic chain       Mini squat - no pain        Single leg squat - no pain bilaterally     Radiology  Visualized radiographs of bilateral knee obtained today, and reviewed the images with the patient.  Impression: Bilateral Smiley, bilateral sunrise, and bilateral lateral views of the knees demonstrate no acute osseous abnormality. Joint spaces appear preserved.    Assessment:  1. Pain in both knees, unspecified chronicity    2. Patellofemoral syndrome of both knees      Plan:  Discussed the assessment with the patient.     We discussed the following: symptom treatment, activity modification/rest, imaging, rehab, injection therapy and support for the affected area. Following discussion, plan:   Topical Treatments: The patient is advised to apply ice or cold packs, heat or warm packs intermittently as needed to relieve pain.   Over the counter medication: Patient's preferred OTC medication as directed on packaging.  Plain films of the area reviewed with the patient.   Activity Modification: as discussed; activities as tolerated   Rehab: Physical Therapy: referral placed; advise running evaluation   Medical Equipment: patient has knee brace which she may use as desired with activities.   Follow up: in ~1 month if not improving; otherwise, as needed   Questions answered. The patient indicates understanding of these issues and agrees with the plan.     Stan Zuniga, , CAQ       Disclaimer: This note consists of symbols derived from keyboarding, dictation and/or voice recognition software. As a result, there may be errors in the script that have gone undetected. Please consider this when interpreting information found in this chart.    The information in this document, created by the medical scribe for me, accurately reflects the services I personally performed and the decisions made by me. I have reviewed and  approved this document for accuracy.   Stan Zuniga DO, CACALLIE     Again, thank you for allowing me to participate in the care of your patient.        Sincerely,        Stan Zuniga DO

## 2018-07-03 NOTE — MR AVS SNAPSHOT
After Visit Summary   7/3/2018    Bjorn Sykes    MRN: [id number]           Patient Information     Date Of Birth          1970        Visit Information        Provider Department      7/3/2018 10:00 AM Brad Renee, PT [facility] [place] [facility]        Today's Diagnoses     Chronic pain of left knee    -  1    Chronic pain of right knee           Follow-ups after your visit        Your next 10 appointments already scheduled     Jul 05, 2018 11:20 AM CDT   [facility] Running with Brad Renee PT   [facility] [place] [facility] ([facility] [place])    [address]  [address]  [place] [address] [phone]              Who to contact     If you have questions or need follow up information about today's clinic visit or your schedule please contact [facility] directly at [phone].  Normal or non-critical lab and imaging results will be communicated to you by [employer], letter or phone within 4 business days after the clinic has received the results. If you do not hear from us within 7 days, please contact the clinic through [employer] or phone. If you have a critical or abnormal lab result, we will notify you by phone as soon as possible.  Submit refill requests through [employer] or call your pharmacy and they will forward the refill request to us. Please allow 3 business days for your refill to be completed.          Additional Information About Your Visit        MyChart Information     [employer] gives you secure access to your electronic health record. If you see a primary care provider, you can also send messages to your care team and make appointments. If you have questions, please call your primary care clinic.  If you do not have a primary care provider, please call [phone] and they will assist you.        Care EveryWhere ID     This is your Care EveryWhere ID. This could be used by other organizations to access your  [facility] medical records  [id number]        Your Vitals Were     Last Period                   07/05/2009            Blood Pressure from Last 3 Encounters:   07/03/18 122/78   04/11/18 128/85   03/26/18 132/86    Weight from Last 3 Encounters:   07/03/18 73 kg (161 lb)   04/11/18 71 kg (156 lb 9.6 oz)   03/26/18 73.7 kg (162 lb 7.7 oz)              We Performed the Following     PT Eval, Low Complexity (28498)     Therapeutic Exercises        Primary Care Provider Office Phone # Fax #    Lalo Denzel Ron [phone] [address] [address]        Equal Access to Services     LUIS ANTONIO LING : Hadii aad tristen hadasho Somontserratali, waaxda luqadaha, qaybta kaalmada adeegyada, whitley maldonado . So [facility] [phone].    ATENCIÓN: Si habla español, tiene a simmons disposición servicios gratuitos de asistencia lingüística. LlUniversity Hospitals St[facility] Medical [facility] [phone].    We comply with applicable federal civil rights laws and [place] laws. We do not discriminate on the basis of race, color, national origin, age, disability, sex, sexual orientation, or gender identity.            Thank you!     Thank you for choosing [facility]  for your care. Our goal is always to provide you with excellent care. Hearing back from our patients is one way we can continue to improve our services. Please take a few minutes to complete the written survey that you may receive in the mail after your visit with us. Thank you!             Your Updated Medication List - Protect others around you: Learn how to safely use, store and throw away your medicines at www.disposemymeds.org.          This list is accurate as of 7/3/18  1:18 PM.  Always use your most recent med list.                   Brand Name Dispense Instructions for use Diagnosis    albuterol 108 (90 Base) MCG/ACT Inhaler    PROAIR HFA/PROVENTIL HFA/VENTOLIN HFA    1 Inhaler    Inhale 2 puffs into the lungs every 4 hours  as needed for shortness of breath / dyspnea    Asthma, intermittent, uncomplicated       ascorbic acid 500 MG tablet    VITAMIN C     Take 500 mg by mouth as needed.        CALCIUM 500 + D PO      Take by mouth 2 times daily        cetirizine 10 MG tablet    zyrTEC    90 tablet    Take 1 tablet (10 mg) by mouth daily    Dermatitis       GLUCOSAMINE CHONDR 1500 COMPLX PO      Take  by mouth 2 times daily.        IBUPROFEN PO      Take  by mouth as needed.        mometasone 0.1 % cream    ELOCON    45 g    Apply topically daily as needed Apply 1 dose topically daily as needed    Other eczema       mometasone 50 MCG/ACT spray    NASONEX    3 Box    Spray 2 sprays into both nostrils daily    Asthma, intermittent, uncomplicated       MULTIVITAMIN PO      once daily    Pelvic mass       olopatadine HCl 0.2 % Soln    PATADAY    1 Bottle    Place 1 drop into both eyes daily    Allergic conjunctivitis, bilateral

## 2018-07-03 NOTE — PROGRESS NOTES
Sports Medicine Clinic Visit    PCP: Lalo Ron    Bjorn Sykes is a 48 year old female who is seen  as a self referral presenting with bilateral knee pain.  Patient did run a marathon in [facility] and has had knee pain prior to the race.  Pain is worse in the right.  Pain is mostly anterior.  Currently not having any pain.  Has continued to run.  Pain can be intermittent with running.  Running about 2-3 miles a few times a week currently.  Does have improvement with the use of a knee brace.     **  Pain in the bilateral knees is in the anterior knee and along the lateral and medial aspects (R>L). She does have clicking in the right knee with extension. No swelling noted. Patient uses a compression brace for the right knee which gives relief.     Injury: running     Location of Pain: bilateral anterior knee pain   Duration of Pain: 6+ month(s)  Rating of Pain at worst: 3/10  Rating of Pain Currently: 0/10  Symptoms are better with: Rest  Symptoms are worse with: running, inclines, twisting   Additional Features:   Positive: grinding   Negative: swelling, bruising, popping, catching, locking, instability, paresthesias, numbness, weakness, pain in other joints and systemic symptoms  Other evaluation and/or treatments so far consists of: Nothing  Prior History of related problems: denies     Social History: pharmacist     Review of Systems  Musculoskeletal: as above  Remainder of review of systems is negative including constitutional, CV, pulmonary, GI, Skin and Neurologic except as noted in HPI or medical history.    This document serves as a record of the services and decisions personally performed and made by Stan Zuniga DO, CAQ. It was created on his behalf by Shola Martinez, a trained medical scribe. The creation of this document is based the provider's statements to the medical scribe.  Shola Martinez July 3, 2018, 9:29 AM      Past Medical History:   Diagnosis Date     Adnexal mass  8/2009     Diagnostic skin and sensitization tests 8/98 skin tests per [facility] pos. for M/T only. 9/00 skin tests [facility] pos. for: dog/DM/M only (T not retested)     Dysmenorrhea     IBU Helps     Intermittent asthma      Migraine      Osteopenia     [facility]     Ovarian carcinoma (H) 8/2009    LEFT side   Dr.Jonson Curt mimsts 1990's-2001     Past Surgical History:   Procedure Laterality Date     APPENDECTOMY OPEN  8/13/2009    see above     BUNIONECTOMY  10/16/2012    Procedure: BUNIONECTOMY;  Right Theo bunionectomy;  Surgeon: Daniel Arias MD;  Location: [facility] OR     D & C  8/2007    cysts in uterus     HYSTERECTOMY TOTAL ABDOMINAL, BILATERAL SALPINGO-OOPHORECTOMY, COMBINED  8/13/2009    appy also     HYSTERECTOMY, PAP NO LONGER INDICATED       Family History   Problem Relation Age of Onset     Alcohol/Drug Father      C.A.D. Paternal Grandfather      Hypertension Paternal Grandfather      Cerebrovascular Disease Paternal Grandfather      Genetic Disorder Sister      Angelman's syndrome     Thyroid Disease Paternal Grandmother      Breast Cancer Other      aunt, maternal     Alzheimer Disease Other      aunt     Thyroid Disease Sister      Macular Degeneration No family hx of      Glaucoma No family hx of      Social History     Social History     Marital status:      Spouse name: N/A     Number of children: N/A     Years of education: N/A     Occupational History     pharmacist      Social History Main Topics     Smoking status: Never Smoker     Smokeless tobacco: Never Used     Alcohol use Yes      Comment: occasional     Drug use: No     Sexual activity: Yes     Partners: Male     Birth control/ protection: Surgical     Other Topics Concern     Not on file     Social History Narrative    March 26, 2018    ENVIRONMENTAL HISTORY: [place] The home is heated with a forced air. They do have central air conditioning. The patient's bedroom is  "furnished with hard jorge in bedroom, allergen mattress cover and allergen pillowcase cover. No pets inside the house. There is no  history of cockroach or mice infestation. There are no smokers in the house.  The house does have a damp basement, use a dehumidifier in the summer.    1961       Objective  /78  Ht 5' 4.5\" (1.638 m)  Wt 161 lb (73 kg)  LMP 07/05/2009  BMI 27.21 kg/m2    GENERAL APPEARANCE: healthy, alert and no distress   GAIT: NORMAL  SKIN: no suspicious lesions or rashes  NEURO: Normal strength and tone, mentation intact and speech normal  PSYCH:  mentation appears normal and affect normal/bright  HEENT: no scleral icterus  CV: no extremity edema   RESP: nonlabored breathing     Bilateral Knee exam    ROM:        Flexion full, symmetric, no pain       Extension full, symmetric, no pain     Inspection:       no visible ecchymosis       no visible edema or effusion    Skin:       no visible deformities       well perfused       capillary refill brisk    Patellar Motion:        Crepitus noted in the patellofemoral joint, minimal bilaterally        No pain with patellar translation bilaterally     Tender:        Lateral joint line bilaterally       Medial joint line bilaterally      Non Tender:         remainder of knee area    Special Tests:   Right Knee:       neg (-) Malik, no pain        neg (-) Lachman       neg (-) anterior drawer       neg (-) posterior drawer       neg (-) varus, no pain        neg (-) valgus, no pain        no pain with forced extension    Left Knee:       neg (-) Malik, no pain        neg (-) Lachman       neg (-) anterior drawer       neg (-) posterior drawer       neg (-) varus, no pain        neg (-) valgus, no pain        no pain with forced extension    Evaluation of ipsilateral kinetic chain       Mini squat - no pain        Single leg squat - no pain bilaterally     Radiology  Visualized radiographs of bilateral knee obtained today, and reviewed the " images with the patient.  Impression: Bilateral Smiley, bilateral sunrise, and bilateral lateral views of the knees demonstrate no acute osseous abnormality. Joint spaces appear preserved.    Assessment:  1. Pain in both knees, unspecified chronicity    2. Patellofemoral syndrome of both knees      Plan:  Discussed the assessment with the patient.     We discussed the following: symptom treatment, activity modification/rest, imaging, rehab, injection therapy and support for the affected area. Following discussion, plan:   Topical Treatments: The patient is advised to apply ice or cold packs, heat or warm packs intermittently as needed to relieve pain.   Over the counter medication: Patient's preferred OTC medication as directed on packaging.  Plain films of the area reviewed with the patient.   Activity Modification: as discussed; activities as tolerated   Rehab: Physical Therapy: referral placed; advise running evaluation   Medical Equipment: patient has knee brace which she may use as desired with activities.   Follow up: in ~1 month if not improving; otherwise, as needed   Questions answered. The patient indicates understanding of these issues and agrees with the plan.     Stan Zuniga DO, CAQ       Disclaimer: This note consists of symbols derived from keyboarding, dictation and/or voice recognition software. As a result, there may be errors in the script that have gone undetected. Please consider this when interpreting information found in this chart.    The information in this document, created by the medical scribe for me, accurately reflects the services I personally performed and the decisions made by me. I have reviewed and approved this document for accuracy.   Stan Zuniga DO, CAQ

## 2018-07-03 NOTE — MR AVS SNAPSHOT
After Visit Summary   7/3/2018    Bjorn Sykes    MRN: [id number]           Patient Information     Date Of Birth          1970        Visit Information        Provider Department      7/3/2018 9:00 AM Stan Zuniga,  [facility] [place]        Today's Diagnoses     Pain in both knees, unspecified chronicity    -  1    Patellofemoral syndrome of both knees          Care Instructions    Referred to physical therapy     Follow up in ~1 month if not improving; otherwise, as needed           Follow-ups after your visit        Additional Services     [facility] PT, HAND, AND CHIROPRACTIC REFERRAL       **This order will print in the [facility] Scheduling Office**    Physical Therapy, Hand Therapy and Chiropractic Care are available through:    *[facility]  *[facility]  *[facility]    Call one number to schedule at any of the above locations: [phone].    Your provider has referred you to: Physical Therapy at [facility] or [facility]    Indication/Reason for Referral: Knee Pain  Onset of Illness: ~6 months  Therapy Orders: Evaluate and Treat  Special Programs: Running Injury  Special Request: running eval    Isaac Gray      Additional Comments for the Therapist or Chiropractor:     Please be aware that coverage of these services is subject to the terms and limitations of your health insurance plan.  Call member services at your health plan with any benefit or coverage questions.      Please bring the following to your appointment:    *Your personal calendar for scheduling future appointments  *Comfortable clothing                  Follow-up notes from your care team     Return in 1 month (on 8/3/2018), or if symptoms worsen or fail to improve.      Who to contact     If you have questions or need follow up information about today's clinic visit or your schedule please contact [facility] [place] directly  "at [phone].  Normal or non-critical lab and imaging results will be communicated to you by MyChart, letter or phone within 4 business days after the clinic has received the results. If you do not hear from us within 7 days, please contact the clinic through HitFixhart or phone. If you have a critical or abnormal lab result, we will notify you by phone as soon as possible.  Submit refill requests through RatherGather or call your pharmacy and they will forward the refill request to us. Please allow 3 business days for your refill to be completed.          Additional Information About Your Visit        HitFixhart Information     RatherGather gives you secure access to your electronic health record. If you see a primary care provider, you can also send messages to your care team and make appointments. If you have questions, please call your primary care clinic.  If you do not have a primary care provider, please call [phone] and they will assist you.        Care EveryWhere ID     This is your Care EveryWhere ID. This could be used by other organizations to access your [facility] medical records  [id number]        Your Vitals Were     Height Last Period BMI (Body Mass Index)             5' 4.5\" (1.638 m) 07/05/2009 27.21 kg/m2          Blood Pressure from Last 3 Encounters:   07/03/18 122/78   04/11/18 128/85   03/26/18 132/86    Weight from Last 3 Encounters:   07/03/18 161 lb (73 kg)   04/11/18 156 lb 9.6 oz (71 kg)   03/26/18 162 lb 7.7 oz (73.7 kg)              We Performed the Following     [facility] PT, HAND, AND CHIROPRACTIC REFERRAL        Primary Care Provider Office Phone # Fax #    Lalo Ron [phone]       [address] [place] [address]        Equal Access to Services     [facility] SUSHIL AH: Hadii jon ramon hadabbieo Sorapan, waaxda luqadaha, qaybta kaalmada juni, whitley mcdonald. So [facility] [phone].    ATENCIÓN: Si habla español, tiene a simmons disposición " servicios gratuitos de asistencia lingüística. Kellie conley [phone].    We comply with applicable federal civil rights laws and [place] laws. We do not discriminate on the basis of race, color, national origin, age, disability, sex, sexual orientation, or gender identity.            Thank you!     Thank you for choosing [facility]  for your care. Our goal is always to provide you with excellent care. Hearing back from our patients is one way we can continue to improve our services. Please take a few minutes to complete the written survey that you may receive in the mail after your visit with us. Thank you!             Your Updated Medication List - Protect others around you: Learn how to safely use, store and throw away your medicines at www.disposemymeds.org.          This list is accurate as of 7/3/18  9:46 AM.  Always use your most recent med list.                   Brand Name Dispense Instructions for use Diagnosis    albuterol 108 (90 Base) MCG/ACT Inhaler    PROAIR HFA/PROVENTIL HFA/VENTOLIN HFA    1 Inhaler    Inhale 2 puffs into the lungs every 4 hours as needed for shortness of breath / dyspnea    Asthma, intermittent, uncomplicated       ascorbic acid 500 MG tablet    VITAMIN C     Take 500 mg by mouth as needed.        CALCIUM 500 + D PO      Take by mouth 2 times daily        cetirizine 10 MG tablet    zyrTEC    90 tablet    Take 1 tablet (10 mg) by mouth daily    Dermatitis       GLUCOSAMINE CHONDR 1500 COMPLX PO      Take  by mouth 2 times daily.        IBUPROFEN PO      Take  by mouth as needed.        mometasone 0.1 % cream    ELOCON    45 g    Apply topically daily as needed Apply 1 dose topically daily as needed    Other eczema       mometasone 50 MCG/ACT spray    NASONEX    3 Box    Spray 2 sprays into both nostrils daily    Asthma, intermittent, uncomplicated       MULTIVITAMIN PO      once daily    Pelvic mass       olopatadine HCl 0.2 % Soln    PATADAY    1  Bottle    Place 1 drop into both eyes daily    Allergic conjunctivitis, bilateral

## 2018-07-05 ENCOUNTER — THERAPY VISIT (OUTPATIENT)
Dept: PHYSICAL THERAPY | Facility: CLINIC | Age: 48
End: 2018-07-05
Payer: COMMERCIAL

## 2018-07-05 DIAGNOSIS — G89.29 CHRONIC PAIN OF RIGHT KNEE: ICD-10-CM

## 2018-07-05 DIAGNOSIS — G89.29 CHRONIC PAIN OF LEFT KNEE: ICD-10-CM

## 2018-07-05 DIAGNOSIS — M25.561 CHRONIC PAIN OF RIGHT KNEE: ICD-10-CM

## 2018-07-05 DIAGNOSIS — M25.562 CHRONIC PAIN OF LEFT KNEE: ICD-10-CM

## 2018-07-05 PROCEDURE — 97110 THERAPEUTIC EXERCISES: CPT | Mod: GP | Performed by: PHYSICAL THERAPIST

## 2018-07-05 NOTE — MR AVS SNAPSHOT
After Visit Summary   7/5/2018    Bjorn Sykes    MRN: [id number]           Patient Information     Date Of Birth          1970        Visit Information        Provider Department      7/5/2018 11:20 AM Brad Renee, PT [facility] [place] [facility]        Today's Diagnoses     Chronic pain of left knee        Chronic pain of right knee           Follow-ups after your visit        Your next 10 appointments already scheduled     Jul 27, 2018  1:30 PM CDT   [facility] Running with Brad Renee PT   [facility] [place] [facility] ([facility] [place])    [address]  [address]  [place] [address] [phone]              Who to contact     If you have questions or need follow up information about today's clinic visit or your schedule please contact [facility] directly at [phone].  Normal or non-critical lab and imaging results will be communicated to you by MyChart, letter or phone within 4 business days after the clinic has received the results. If you do not hear from us within 7 days, please contact the clinic through MediBeaconhart or phone. If you have a critical or abnormal lab result, we will notify you by phone as soon as possible.  Submit refill requests through WeedWall or call your pharmacy and they will forward the refill request to us. Please allow 3 business days for your refill to be completed.          Additional Information About Your Visit        MyChart Information     WeedWall gives you secure access to your electronic health record. If you see a primary care provider, you can also send messages to your care team and make appointments. If you have questions, please call your primary care clinic.  If you do not have a primary care provider, please call [phone] and they will assist you.        Care EveryWhere ID     This is your Care EveryWhere ID. This could be used by other organizations to access your  [place] medical records  [id number]        Your Vitals Were     Last Period                   07/05/2009            Blood Pressure from Last 3 Encounters:   07/03/18 122/78   04/11/18 128/85   03/26/18 132/86    Weight from Last 3 Encounters:   07/03/18 73 kg (161 lb)   04/11/18 71 kg (156 lb 9.6 oz)   03/26/18 73.7 kg (162 lb 7.7 oz)              We Performed the Following     Therapeutic Exercises        Primary Care Provider Office Phone # Fax #    Lalo Denzel Ron [phone] [address] [address]        Equal Access to Services     LUIS ANTONIO LING : Hadii jon carusoo Soarpan, waaxda luqadaha, qaybta kaalmada adebradyda, whitley mcdonald. So [facility] [place] [facility] [phone].    ATENCIÓN: Si habla español, tiene a simmons disposición servicios gratuitos de asistencia lingüística. Llame al [phone].    We comply with applicable federal civil rights laws and [place] laws. We do not discriminate on the basis of race, color, national origin, age, disability, sex, sexual orientation, or gender identity.            Thank you!     Thank you for choosing [facility]   for your care. Our goal is always to provide you with excellent care. Hearing back from our patients is one way we can continue to improve our services. Please take a few minutes to complete the written survey that you may receive in the mail after your visit with us. Thank you!             Your Updated Medication List - Protect others around you: Learn how to safely use, store and throw away your medicines at www.disposemymeds.org.          This list is accurate as of 7/5/18 12:09 PM.  Always use your most recent med list.                   Brand Name Dispense Instructions for use Diagnosis    albuterol 108 (90 Base) MCG/ACT Inhaler    PROAIR HFA/PROVENTIL HFA/VENTOLIN HFA    1 Inhaler    Inhale 2 puffs into the lungs every 4 hours as needed for shortness of breath /  dyspnea    Asthma, intermittent, uncomplicated       ascorbic acid 500 MG tablet    VITAMIN C     Take 500 mg by mouth as needed.        CALCIUM 500 + D PO      Take by mouth 2 times daily        cetirizine 10 MG tablet    zyrTEC    90 tablet    Take 1 tablet (10 mg) by mouth daily    Dermatitis       GLUCOSAMINE CHONDR 1500 COMPLX PO      Take  by mouth 2 times daily.        IBUPROFEN PO      Take  by mouth as needed.        mometasone 0.1 % cream    ELOCON    45 g    Apply topically daily as needed Apply 1 dose topically daily as needed    Other eczema       mometasone 50 MCG/ACT spray    NASONEX    3 Box    Spray 2 sprays into both nostrils daily    Asthma, intermittent, uncomplicated       MULTIVITAMIN PO      once daily    Pelvic mass       olopatadine HCl 0.2 % Soln    PATADAY    1 Bottle    Place 1 drop into both eyes daily    Allergic conjunctivitis, bilateral

## 2018-09-04 PROBLEM — G89.29 CHRONIC PAIN OF RIGHT KNEE: Status: RESOLVED | Noted: 2018-07-03 | Resolved: 2018-09-04

## 2018-09-04 PROBLEM — M25.561 CHRONIC PAIN OF RIGHT KNEE: Status: RESOLVED | Noted: 2018-07-03 | Resolved: 2018-09-04

## 2018-09-04 PROBLEM — M25.562 CHRONIC PAIN OF LEFT KNEE: Status: RESOLVED | Noted: 2018-07-03 | Resolved: 2018-09-04

## 2018-09-04 PROBLEM — G89.29 CHRONIC PAIN OF LEFT KNEE: Status: RESOLVED | Noted: 2018-07-03 | Resolved: 2018-09-04

## 2018-09-04 NOTE — PROGRESS NOTES
Subjective:  HPI                    Objective:  System    Physical Exam    General     ROS    Assessment/Plan:    DISCHARGE REPORT    Progress reporting period is from 07/03/2018 to today.   Patient has not returned to therapy so current subjective and  objective findings are unknown.  The subjective and objective information are from the last visit (07/05/2018) with this patient.    SUBJECTIVE  Subjective: Pt reports she has done her exercises twice over the last two days and has noted they have gotten easier.  Has not run in a few weeks but eager to get back to it.   Current Pain level: 0/10   Initial Pain level: 3/10     OBJECTIVE  Objective: Pt ran in clinic at self selected speed of 5 mph.  She noted some L knee discomfort at initial josé miguel of 168 steps per minute that was abolished at 176 steps per minute.      ASSESSMENT/PLAN  Updated problem list and treatment plan: home program  STG/LTGs have been met or progress has been made towards goals:  N/A  Assessment of Progress: The patient has not returned to therapy. Current status is unknown.  Self Management Plans:  Patient has been instructed in a home treatment program.  Bjorn continues to require the following intervention to meet STG and LTG's: PT intervention is no longer required to meet STG/LTG.    Recommendations:  Pt last seen in PT 07/05/2018.  She cancelled f/u beyond that noting that she was doing well.  Have not heard from her since.  [facility]charge to [facility].

## 2018-09-06 ENCOUNTER — TELEPHONE (OUTPATIENT)
Dept: ALLERGY | Facility: CLINIC | Age: 48
End: 2018-09-06

## 2018-09-06 NOTE — LETTER
My Asthma Action Plan  Name: Bjorn Sykes   YOB: 1970  Date: 9/9/2018   My doctor: Maulik Titus MD   My clinic: [facility]        My Control Medicine: None  My Rescue Medicine: Albuterol (Proair/Ventolin/Proventil) inhaler 2-4 puffs every 4 hrs as needed   My Asthma Severity: intermittent  Avoid your asthma triggers: exercise or sports               GREEN ZONE   Good Control    I feel good    No cough or wheeze    Can work, sleep and play without asthma symptoms       Take your asthma control medicine every day.     1. If exercise triggers your asthma, take your rescue medication    15 minutes before exercise or sports, and    During exercise if you have asthma symptoms  2. Spacer to use with inhaler: If you have a spacer, make sure to use it with your inhaler             YELLOW ZONE Getting Worse  I have ANY of these:    I do not feel good    Cough or wheeze    Chest feels tight    Wake up at night   1. Keep taking your Green Zone medications  2. Start taking your rescue medicine:    every 20 minutes for up to 1 hour. Then every 4 hours for 24-48 hours.  3. If you stay in the Yellow Zone for more than 24-48 hours, contact your doctor.             RED ZONE Medical Alert - Get Help  I have ANY of these:    I feel awful    Medicine is not helping    Breathing getting harder    Trouble walking or talking    Nose opens wide to breathe       1. Take your rescue medicine NOW  2. If your provider has prescribed an oral steroid medicine, start taking it NOW  3. Call your doctor NOW  4. If you are still in the Red Zone after 20 minutes and you have not reached your doctor:    Take your rescue medicine again and    Call 911 or go to the emergency room right away    See your regular doctor within 2 weeks of an Emergency Room or Urgent Care visit for follow-up treatment.          Annual Reminders:  Meet with Asthma Educator,  Flu Shot in the Fall, consider Pneumonia Vaccination for patients  with asthma (aged 19 and older).    Pharmacy:    [id number] - [place], [place] - [address] AT [address] OF [facility] [address]  [facility] [place] - [address], [id number]  [facility] [place] [address] [facility]  [id number] - [place], [place] - [address] [facility]  [id number] - [place], [place] - [address] AT [facility] & [facility]                      Asthma Triggers  How To Control Things That Make Your Asthma Worse    Triggers are things that make your asthma worse.  Look at the list below to help you find your triggers and what you can do about them.  You can help prevent asthma flare-ups by staying away from your triggers.      Trigger                                                          What you can do   Cigarette Smoke  Tobacco smoke can make asthma worse. Do not allow smoking in your home, car or around you.  Be sure no one smokes at a child s day care or school.  If you smoke, ask your health care provider for ways to help you quit.  Ask family members to quit too.  Ask your health care provider for a referral to Quit Plan to help you quit smoking, or call 3-434-767-PLAN.     Colds, Flu, Bronchitis  These are common triggers of asthma. Wash your hands often.  Don t touch your eyes, nose or mouth.  Get a flu shot every year.     Dust Mites  These are tiny bugs that live in cloth or carpet. They are too small to see. Wash sheets and blankets in hot water every week.   Encase pillows and mattress in dust mite proof covers.  Avoid having carpet if you can. If you have carpet, vacuum weekly.   Use a dust mask and HEPA vacuum.   Pollen and Outdoor Mold  Some people are allergic to trees, grass, or weed pollen, or molds. Try to keep your windows closed.  Limit time out doors when pollen count is high.   Ask you health care provider about taking medicine during allergy  season.     Animal Dander  Some people are allergic to skin flakes, urine or saliva from pets with fur or feathers. Keep pets with fur or feathers out of your home.    If you can t keep the pet outdoors, then keep the pet out of your bedroom.  Keep the bedroom door closed.  Keep pets off cloth furniture and away from stuffed toys.     Mice, Rats, and Cockroaches  Some people are allergic to the waste from these pests.   Cover food and garbage.  Clean up spills and food crumbs.  Store grease in the refrigerator.   Keep food out of the bedroom.   Indoor Mold  This can be a trigger if your home has high moisture. Fix leaking faucets, pipes, or other sources of water.   Clean moldy surfaces.  Dehumidify basement if it is damp and smelly.   Smoke, Strong Odors, and Sprays  These can reduce air quality. Stay away from strong odors and sprays, such as perfume, powder, hair spray, paints, smoke incense, paint, cleaning products, candles and new carpet.   Exercise or Sports  Some people with asthma have this trigger. Be active!  Ask your doctor about taking medicine before sports or exercise to prevent symptoms.    Warm up for 5-10 minutes before and after sports or exercise.     Other Triggers of Asthma  Cold air:  Cover your nose and mouth with a scarf.  Sometimes laughing or crying can be a trigger.  Some medicines and food can trigger asthma.

## 2018-10-03 NOTE — PROGRESS NOTES
See exchange of SPEEDELO messages in chart today.  Left message at the number listed in pt's message requesting return call to [phone].

## 2018-12-04 ENCOUNTER — TELEPHONE (OUTPATIENT)
Dept: OPHTHALMOLOGY | Facility: CLINIC | Age: 48
End: 2018-12-04

## 2018-12-04 NOTE — TELEPHONE ENCOUNTER
Fax received from pharmacy for Olopatadine 0.1% Opth Solution. Refill denied as patient has not been seen since 2016 and this specific medication has never been ordered by requested provider.

## 2019-04-10 ENCOUNTER — OFFICE VISIT (OUTPATIENT)
Dept: OPHTHALMOLOGY | Facility: CLINIC | Age: 49
End: 2019-04-10
Payer: COMMERCIAL

## 2019-04-10 DIAGNOSIS — H52.4 PRESBYOPIA: ICD-10-CM

## 2019-04-10 DIAGNOSIS — H10.13 ALLERGIC CONJUNCTIVITIS, BILATERAL: ICD-10-CM

## 2019-04-10 DIAGNOSIS — H18.519 CORNEA GUTTATA: Primary | ICD-10-CM

## 2019-04-10 PROCEDURE — 92014 COMPRE OPH EXAM EST PT 1/>: CPT | Performed by: OPHTHALMOLOGY

## 2019-04-10 PROCEDURE — 92015 DETERMINE REFRACTIVE STATE: CPT | Performed by: OPHTHALMOLOGY

## 2019-04-10 RX ORDER — OLOPATADINE HYDROCHLORIDE 2 MG/ML
1 SOLUTION/ DROPS OPHTHALMIC DAILY
Qty: 1 BOTTLE | Refills: 11 | Status: SHIPPED | OUTPATIENT
Start: 2019-04-10 | End: 2020-11-24

## 2019-04-10 ASSESSMENT — REFRACTION_WEARINGRX
SPECS_TYPE: OTC
OD_SPHERE: +1.50
OS_CYLINDER: SPHERE
OS_AXIS: 164
OD_ADD: +1.75
OD_SPHERE: PLANO
OS_SPHERE: -0.25
OD_CYLINDER: +1.00
OD_CYLINDER: SPHERE
OS_SPHERE: +1.50
OS_CYLINDER: +0.50
OS_ADD: +1.75
OD_AXIS: 006
SPECS_TYPE: PAL

## 2019-04-10 ASSESSMENT — REFRACTION_MANIFEST
OD_CYLINDER: +1.00
OS_AXIS: 173
OS_ADD: +2.00
OS_CYLINDER: +0.75
OS_SPHERE: PLANO
OD_ADD: +2.00
OD_AXIS: 007
OD_SPHERE: PLANO

## 2019-04-10 ASSESSMENT — EXTERNAL EXAM - RIGHT EYE: OD_EXAM: NORMAL

## 2019-04-10 ASSESSMENT — VISUAL ACUITY
CORRECTION_TYPE: GLASSES
OS_CC: 2
OD_CC: 2
OD_SC: 20/25
OS_SC: 20/25
OD_CC+: -3
OD_CC: 20/20
OD_SC+: -2
METHOD: SNELLEN - LINEAR
OS_CC: 20/20

## 2019-04-10 ASSESSMENT — SLIT LAMP EXAM - LIDS
COMMENTS: 1+ DERMATOCHALASIS - UPPER LID
COMMENTS: 1+ DERMATOCHALASIS - UPPER LID

## 2019-04-10 ASSESSMENT — TONOMETRY
IOP_METHOD: APPLANATION
OS_IOP_MMHG: 20
OD_IOP_MMHG: 16

## 2019-04-10 ASSESSMENT — CONF VISUAL FIELD
OS_NORMAL: 1
OD_NORMAL: 1

## 2019-04-10 ASSESSMENT — EXTERNAL EXAM - LEFT EYE: OS_EXAM: NORMAL

## 2019-04-10 ASSESSMENT — CUP TO DISC RATIO
OD_RATIO: 0.2
OS_RATIO: 0.3

## 2019-04-10 NOTE — PATIENT INSTRUCTIONS
Glasses Rx given - optional.  Ok to continue using over the counter readers (+1.50 to +2.00) as needed for close up vision.  Continue using Pataday both eyes twice daily as needed.  Use artificial tears up to 4 times daily both eyes.  (Refresh Tears, Systane Ultra/Balance, or Theratears)  Call in December 2019 for an appointment in April 2020 for Complete Exam.    Dr. Bang [phone]

## 2019-04-10 NOTE — PROGRESS NOTES
Current Eye Medications:  patanol both eyes daily, as needed.       Subjective:  Comprehensive Eye Exam.  She has bifocals but doesn't wear them much (she has worn them about 4 times in the past year, for driving in the dark).   She prefers over-the-counter readers (I-Bobs)     Pataday once daily.  Nasonex x 18 yrs.  Still with [place] Board [employer] Pharmacy.      Objective:  See Ophthalmology Exam.       Assessment:  Stable eye exam.  No change in corneal guttata.      ICD-10-CM    1. 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 Allergic conjunctivitis, bilateral H10.13 olopatadine (PATADAY) 0.2 % ophthalmic solution   3. Presbyopia H52.4 REFRACTIVE STATUS        Plan:  Glasses Rx given - optional.  Ok to continue using over the counter readers (+1.50 to +2.00) as needed for close up vision.  Continue using Pataday both eyes twice daily as needed.  Use artificial tears up to 4 times daily both eyes.  (Refresh Tears, Systane Ultra/Balance, or Theratears)  Call in December 2019/20 for an appointment in April 2020/21 for Complete Exam.    Dr. Bang [phone]

## 2019-04-23 ENCOUNTER — OFFICE VISIT (OUTPATIENT)
Dept: OTOLARYNGOLOGY | Facility: CLINIC | Age: 49
End: 2019-04-23
Payer: COMMERCIAL

## 2019-04-23 ENCOUNTER — OFFICE VISIT (OUTPATIENT)
Dept: OBGYN | Facility: CLINIC | Age: 49
End: 2019-04-23
Payer: COMMERCIAL

## 2019-04-23 ENCOUNTER — ANCILLARY PROCEDURE (OUTPATIENT)
Dept: MAMMOGRAPHY | Facility: CLINIC | Age: 49
End: 2019-04-23
Payer: COMMERCIAL

## 2019-04-23 VITALS
DIASTOLIC BLOOD PRESSURE: 81 MMHG | HEIGHT: 65 IN | HEART RATE: 63 BPM | OXYGEN SATURATION: 99 % | SYSTOLIC BLOOD PRESSURE: 124 MMHG | BODY MASS INDEX: 26.82 KG/M2 | WEIGHT: 161 LBS

## 2019-04-23 VITALS
OXYGEN SATURATION: 99 % | HEART RATE: 63 BPM | SYSTOLIC BLOOD PRESSURE: 124 MMHG | BODY MASS INDEX: 26.82 KG/M2 | DIASTOLIC BLOOD PRESSURE: 81 MMHG | WEIGHT: 161 LBS | HEIGHT: 65 IN

## 2019-04-23 DIAGNOSIS — Z12.31 VISIT FOR SCREENING MAMMOGRAM: ICD-10-CM

## 2019-04-23 DIAGNOSIS — J45.20 ASTHMA, INTERMITTENT, UNCOMPLICATED: ICD-10-CM

## 2019-04-23 DIAGNOSIS — Z85.43 HISTORY OF OVARIAN CANCER: ICD-10-CM

## 2019-04-23 DIAGNOSIS — Z01.419 ENCOUNTER FOR GYNECOLOGICAL EXAMINATION WITHOUT ABNORMAL FINDING: Primary | ICD-10-CM

## 2019-04-23 DIAGNOSIS — R13.10 DYSPHAGIA, UNSPECIFIED TYPE: Primary | ICD-10-CM

## 2019-04-23 DIAGNOSIS — Z23 NEED FOR DIPHTHERIA-TETANUS-PERTUSSIS (TDAP) VACCINE: ICD-10-CM

## 2019-04-23 LAB — CANCER AG125 SERPL-ACNC: 6 U/ML (ref 0–30)

## 2019-04-23 PROCEDURE — 99396 PREV VISIT EST AGE 40-64: CPT | Mod: 25 | Performed by: OBSTETRICS & GYNECOLOGY

## 2019-04-23 PROCEDURE — 77067 SCR MAMMO BI INCL CAD: CPT | Mod: TC

## 2019-04-23 PROCEDURE — 86304 IMMUNOASSAY TUMOR CA 125: CPT | Performed by: OBSTETRICS & GYNECOLOGY

## 2019-04-23 PROCEDURE — 90471 IMMUNIZATION ADMIN: CPT | Performed by: OBSTETRICS & GYNECOLOGY

## 2019-04-23 PROCEDURE — 90715 TDAP VACCINE 7 YRS/> IM: CPT | Performed by: OBSTETRICS & GYNECOLOGY

## 2019-04-23 PROCEDURE — 99203 OFFICE O/P NEW LOW 30 MIN: CPT | Performed by: OTOLARYNGOLOGY

## 2019-04-23 PROCEDURE — 36415 COLL VENOUS BLD VENIPUNCTURE: CPT | Performed by: OBSTETRICS & GYNECOLOGY

## 2019-04-23 RX ORDER — MOMETASONE FUROATE MONOHYDRATE 50 UG/1
2 SPRAY, METERED NASAL DAILY
Qty: 3 BOX | Refills: 3 | Status: SHIPPED | OUTPATIENT
Start: 2019-04-23 | End: 2020-12-02

## 2019-04-23 ASSESSMENT — MIFFLIN-ST. JEOR
SCORE: 1348.23
SCORE: 1348.23

## 2019-04-23 NOTE — PROGRESS NOTES
Bjorn Sykes is a 48 year old female , who presents for annual exam.   No unusual bleeding, no incontinence, or unusual discharge.     Last cholesterol:   Recent Labs   Lab Test 16  0818   CHOL 222*   HDL 76   *   TRIG 63     Past Medical History:   Diagnosis Date     Adnexal mass 2009     Diagnostic skin and sensitization tests  skin tests per [facility] pos. for M/T only.  skin tests [facility] pos. for: dog/DM/M only (T not retested)     Dysmenorrhea     IBU Helps     Intermittent asthma      Migraine      Osteopenia     [facility]     Ovarian carcinoma (H) 2009    LEFT side   Dr.Jonson Curt PANIAGUA     Plantar warts s-       Past Surgical History:   Procedure Laterality Date     APPENDECTOMY OPEN  2009    see above     BUNIONECTOMY  10/16/2012    Procedure: BUNIONECTOMY;  Right Theo bunionectomy;  Surgeon: Daniel Arias MD;  Location: [facility]     D & C  2007    cysts in uterus     HYSTERECTOMY TOTAL ABDOMINAL, BILATERAL SALPINGO-OOPHORECTOMY, COMBINED  2009    appy also     HYSTERECTOMY, PAP NO LONGER INDICATED         OB History    Para Term  AB Living   0 0 0 0 0 0   SAB TAB Ectopic Multiple Live Births   0 0 0 0 0       Gyn History:  Gynecological History         Patient's last menstrual period was 2009.     no STD /no PID/no IUD      history of abnormal pap smear:  no  Last pap:   Lab Results   Component Value Date    PAP NIL 2016           Current Outpatient Medications   Medication Sig Dispense Refill     ascorbic acid (VITAMIN C) 500 MG tablet Take 500 mg by mouth as needed.       Calcium Carbonate-Vitamin D (CALCIUM 500 + D PO) Take by mouth 2 times daily       cetirizine (ZYRTEC) 10 MG tablet Take 1 tablet (10 mg) by mouth daily 90 tablet 3     Glucosamine-Chondroit-Vit C-Mn (GLUCOSAMINE CHONDR 1500 COMPLX PO) Take  by mouth 2 times daily.       IBUPROFEN PO Take  by mouth as needed.       mometasone (ELOCON) 0.1 % cream Apply  topically daily as needed Apply 1 dose topically daily as needed 45 g 3     mometasone (NASONEX) 50 MCG/ACT spray Spray 2 sprays into both nostrils daily 3 Box 3     MULTIVITAMIN OR once daily       olopatadine (PATADAY) 0.2 % ophthalmic solution Place 1 drop into both eyes daily 1 Bottle 11     albuterol (PROAIR HFA/PROVENTIL HFA/VENTOLIN HFA) 108 (90 BASE) MCG/ACT Inhaler Inhale 2 puffs into the lungs every 4 hours as needed for shortness of breath / dyspnea 1 Inhaler 2       Allergies   Allergen Reactions     Flagyl [Metronidazole] Rash     Sulfa Drugs Rash       Social History     Socioeconomic History     Marital status:      Spouse name: Not on file     Number of children: Not on file     Years of education: Not on file     Highest education level: Not on file   Occupational History     Occupation: pharmacist   Social Needs     Financial resource strain: Not on file     Food insecurity:     Worry: Not on file     Inability: Not on file     Transportation needs:     Medical: Not on file     Non-medical: Not on file   Tobacco Use     Smoking status: Never Smoker     Smokeless tobacco: Never Used   Substance and Sexual Activity     Alcohol use: Yes     Comment: occasional     Drug use: No     Sexual activity: Yes     Partners: Male     Birth control/protection: Surgical   Lifestyle     Physical activity:     Days per week: Not on file     Minutes per session: Not on file     Stress: Not on file   Relationships     Social connections:     Talks on phone: Not on file     Gets together: Not on file     Attends Ep[employer]alian service: Not on file     Active member of club or organization: Not on file     Attends meetings of clubs or organizations: Not on file     Relationship status: Not on file     Intimate partner violence:     Fear of current or ex partner: Not on file     Emotionally abused: Not on file     Physically abused: Not on file     Forced sexual activity: Not on file   Other Topics Concern      "Parent/sibling w/ CABG, MI or angioplasty before 65F 55M? Not Asked   Social History Narrative    March 26, 2018    ENVIRONMENTAL HISTORY: [place] The home is heated with a forced air. They do have central air conditioning. The patient's bedroom is furnished with hard jorge in bedroom, allergen mattress cover and allergen pillowcase cover. No pets inside the house. There is no  history of cockroach or mice infestation. There are no smokers in the house.  The house does have a damp basement, use a dehumidifier in the summer.    1961       Family History   Problem Relation Age of Onset     Alcohol/Drug Father      C.A.D. Paternal Grandfather      Hypertension Paternal Grandfather      Cerebrovascular Disease Paternal Grandfather      Genetic Disorder Sister         Angelman's syndrome     Thyroid Disease Paternal Grandmother      Breast Cancer Other         aunt, maternal     Alzheimer Disease Other         aunt     Thyroid Disease Sister      Macular Degeneration No family hx of      Glaucoma No family hx of          ROS:  All negative except as above.      EXAM:  /81 (BP Location: Right arm, Cuff Size: Adult Regular)   Pulse 63   Ht 1.638 m (5' 4.5\")   Wt 73 kg (161 lb)   LMP 07/05/2009   SpO2 99%   BMI 27.21 kg/m    General:  WNWD female, NAD  Alert  Oriented x 3  Gait:  Normal  Skin:  Normal skin turgor  Neurologic:  CN grossly intact, good sensation.    HEENT:  NC/AT, EOMI  Neck:  No masses palpated, symmetrical, carotids +2/4, no bruits heard  Heart:  RRR  Lungs:  Clear   Breasts:  Symmetrical, no dimpling noted, no masses palpated, no discharge expressed  Abdomen:  Non-tender, non-distended.  Vulva: No external lesions, normal hair distribution, no adenopathy  BUS:  Normal, no masses noted  Urethra:  No hypermobility noted  Urethral meatus:  No masses noted  Vagina: Atrophic, no abnormal discharge, no lesions  Cervix: Absent   Uterus: " Absent  Bimanual:  No masses palpated.  Non tender.   Perianal:  No masses noted.    Rectal exam: No mass, non-tender, normal sphincter tone  Recto-Vaginal Exam:  No masses palpated.   Extremities:  No clubbing, cyanosis, or edema      ASSESSMENT/PLAN   Annual examination   History of ovarian cancer,  is ordered today  Mammogram results and films reviewed with her.   Low fat diet, weight bearing exercises and self breast exams on a monthly basis have been recommended.  I have discussed with patient the risks, benefits, medications, treatment options and modalities.   I have instructed the patient to call or schedule a follow-up appointment if any problems.

## 2019-04-23 NOTE — LETTER
4/23/2019         RE: Bjorn Sykes  [address] [place] [address]        Dear Colleague,    Thank you for referring your patient, Bjorn Sykes, to the [facility]. Please see a copy of my visit note below.    Chief Complaint - troubles swallowing    History of Present Illness - Bjorn Sykes is a 48 year old female who presents with a history of troubles swallowing since a few years. They describe this as things getting stuck and she points to epigastric area. Rice is the worst, or larger bites. Has had emesis at times. No pain. Liquids are okay. Voicing has seemed normal. They note no history of relux. Not tried anything, maybe avoidance. No cough or hemoptysis. Nonsmoker. Has some family members with same thing. One had stomach stretched.     Past Medical History -   Patient Active Problem List   Diagnosis     Personal history of ovarian cancer     CARDIOVASCULAR SCREENING; LDL GOAL LESS THAN 160     Cornea guttata, mild, ou     Hallux valgus, acquired     Diagnostic skin and sensitization tests(aka ALLERGENS)       Current Medications -   Current Outpatient Medications:      albuterol (PROAIR HFA/PROVENTIL HFA/VENTOLIN HFA) 108 (90 BASE) MCG/ACT Inhaler, Inhale 2 puffs into the lungs every 4 hours as needed for shortness of breath / dyspnea, Disp: 1 Inhaler, Rfl: 2     ascorbic acid (VITAMIN C) 500 MG tablet, Take 500 mg by mouth as needed., Disp: , Rfl:      Calcium Carbonate-Vitamin D (CALCIUM 500 + D PO), Take by mouth 2 times daily, Disp: , Rfl:      cetirizine (ZYRTEC) 10 MG tablet, Take 1 tablet (10 mg) by mouth daily, Disp: 90 tablet, Rfl: 3     Glucosamine-Chondroit-Vit C-Mn (GLUCOSAMINE CHONDR 1500 COMPLX PO), Take  by mouth 2 times daily., Disp: , Rfl:      IBUPROFEN PO, Take  by mouth as needed., Disp: , Rfl:      mometasone (ELOCON) 0.1 % cream, Apply topically daily as needed Apply 1 dose topically daily as needed, Disp: 45 g, Rfl: 3     mometasone  (NASONEX) 50 MCG/ACT spray, Spray 2 sprays into both nostrils daily, Disp: 3 Box, Rfl: 3     MULTIVITAMIN OR, once daily, Disp: , Rfl:      olopatadine (PATADAY) 0.2 % ophthalmic solution, Place 1 drop into both eyes daily, Disp: 1 Bottle, Rfl: 11    Allergies -   Allergies   Allergen Reactions     Flagyl [Metronidazole] Rash     Sulfa Drugs Rash       Social History -   Social History     Socioeconomic History     Marital status:      Spouse name: Not on file     Number of children: Not on file     Years of education: Not on file     Highest education level: Not on file   Occupational History     Occupation: pharmacist   Social Needs     Financial resource strain: Not on file     Food insecurity:     Worry: Not on file     Inability: Not on file     Transportation needs:     Medical: Not on file     Non-medical: Not on file   Tobacco Use     Smoking status: Never Smoker     Smokeless tobacco: Never Used   Substance and Sexual Activity     Alcohol use: Yes     Comment: occasional     Drug use: No     Sexual activity: Yes     Partners: Male     Birth control/protection: Surgical   Lifestyle     Physical activity:     Days per week: Not on file     Minutes per session: Not on file     Stress: Not on file   Relationships     Social connections:     Talks on phone: Not on file     Gets together: Not on file     Attends [employer] service: Not on file     Active member of club or organization: Not on file     Attends meetings of clubs or organizations: Not on file     Relationship status: Not on file     Intimate partner violence:     Fear of current or ex partner: Not on file     Emotionally abused: Not on file     Physically abused: Not on file     Forced sexual activity: Not on file   Other Topics Concern     Parent/sibling w/ CABG, MI or angioplasty before 65F 55M? Not Asked   Social History Narrative    March 26, 2018    ENVIRONMENTAL HISTORY: [place] The home  "is heated with a forced air. They do have central air conditioning. The patient's bedroom is furnished with hard jorge in bedroom, allergen mattress cover and allergen pillowcase cover. No pets inside the house. There is no  history of cockroach or mice infestation. There are no smokers in the house.  The house does have a damp basement, use a dehumidifier in the summer.    1961       Family History -   Family History   Problem Relation Age of Onset     Alcohol/Drug Father      C.A.D. Paternal Grandfather      Hypertension Paternal Grandfather      Cerebrovascular Disease Paternal Grandfather      Genetic Disorder Sister         Angelman's syndrome     Thyroid Disease Paternal Grandmother      Breast Cancer Other         aunt, maternal     Alzheimer Disease Other         aunt     Thyroid Disease Sister      Macular Degeneration No family hx of      Glaucoma No family hx of      Review of Systems - As per HPI and PMHx, otherwise 10+ comprehensive system review is negative.    Physical Exam  /81   Pulse 63   Ht 1.638 m (5' 4.5\")   Wt 73 kg (161 lb)   LMP 07/05/2009   SpO2 99%   BMI 27.21 kg/m     General - The patient is in no distress. Alert and oriented to person and place, answers questions and cooperates with examination appropriately.   Voice and Breathing - The patient was breathing comfortably without the use of accessory muscles. There was no wheezing, stridor, or stertor.  The patients voice was clear and strong.  Eyes - Extraocular movements intact.  Sclera were not icteric or injected, conjunctiva were pink and moist.  Mouth - Examination of the oral cavity showed pink, healthy oral mucosa. No lesions or ulcerations noted.  The tongue was mobile and midline.  Throat - The walls of the oropharynx were smooth, symmetric, and had no lesions or ulcerations.  The tonsillar pillars and soft palate were symmetric.  The uvula was midline on elevation.   Nose - External contour is symmetric, no gross " deflection or scars.  Nasal mucosa is pink and moist with no abnormal mucus.  The septum was midline and non-obstructive, turbinates of normal size and position.  No polyps, masses, or purulence noted on examination.  Neck -  Palpation of the occipital, submental, submandibular, internal jugular chain, and supraclavicular nodes did not demonstrate any abnormal lymph nodes or masses. No parotid masses. Palpation of the thyroid was soft and smooth, with no nodules or goiter appreciated.  The trachea was mobile and midline.  Neurologic - CN II-XII are grossly intact, no focal neurologic deficits.  Cardiovascular - carotid pulses are 2+ bilaterally, regular rhythm       A/P - Bjorn Sykes is a 48 year old female with dysphagia. She points to her epigastric area. It's worse with solids. Had a family member that needed her esophagus dilated. She denies reflux. I recommend an EGD with dilation if indicated. We can discuss findings and other possible treatment options following EGD.         Dr. Edis Pierre  Otolaryngology  [facility]        Again, thank you for allowing me to participate in the care of your patient.        Sincerely,        Edis Pierre MD

## 2019-04-23 NOTE — NURSING NOTE
Screening Questionnaire for Adult Immunization    Are you sick today?   No   Do you have allergies to medications, food, a vaccine component or latex?   No   Have you ever had a serious reaction after receiving a vaccination?   No   Do you have a long-term health problem with heart disease, lung disease, asthma, kidney disease, metabolic disease (e.g. diabetes), anemia, or other blood disorder?   No   Do you have cancer, leukemia, HIV/AIDS, or any other immune system problem?   No   In the past 3 months, have you taken medications that affect  your immune system, such as prednisone, other steroids, or anticancer drugs; drugs for the treatment of rheumatoid arthritis, Crohn s disease, or psoriasis; or have you had radiation treatments?   No   Have you had a seizure, or a brain or other nervous system problem?   No   During the past year, have you received a transfusion of blood or blood     products, or been given immune (gamma) globulin or antiviral drug?   No   For women: Are you pregnant or is there a chance you could become        pregnant during the next month?   No   Have you received any vaccinations in the past 4 weeks?   No     Immunization questionnaire answers were all negative.        Per orders of Dr. Ron, injection of Tdap given by Ching Morley. Patient instructed to remain in clinic for 15 minutes afterwards, and to report any adverse reaction to me immediately.       Screening performed by Ching Morley on 4/23/2019 at 9:50 AM.

## 2019-04-23 NOTE — PROGRESS NOTES
Chief Complaint - troubles swallowing    History of Present Illness - Bjorn Sykes is a 48 year old female who presents with a history of troubles swallowing since a few years. They describe this as things getting stuck and she points to epigastric area. Rice is the worst, or larger bites. Has had emesis at times. No pain. Liquids are okay. Voicing has seemed normal. They note no history of relux. Not tried anything, maybe avoidance. No cough or hemoptysis. Nonsmoker. Has some family members with same thing. One had stomach stretched.     Past Medical History -   Patient Active Problem List   Diagnosis     Personal history of ovarian cancer     CARDIOVASCULAR SCREENING; LDL GOAL LESS THAN 160     Cornea guttata, mild, ou     Hallux valgus, acquired     Diagnostic skin and sensitization tests(aka ALLERGENS)       Current Medications -   Current Outpatient Medications:      albuterol (PROAIR HFA/PROVENTIL HFA/VENTOLIN HFA) 108 (90 BASE) MCG/ACT Inhaler, Inhale 2 puffs into the lungs every 4 hours as needed for shortness of breath / dyspnea, Disp: 1 Inhaler, Rfl: 2     ascorbic acid (VITAMIN C) 500 MG tablet, Take 500 mg by mouth as needed., Disp: , Rfl:      Calcium Carbonate-Vitamin D (CALCIUM 500 + D PO), Take by mouth 2 times daily, Disp: , Rfl:      cetirizine (ZYRTEC) 10 MG tablet, Take 1 tablet (10 mg) by mouth daily, Disp: 90 tablet, Rfl: 3     Glucosamine-Chondroit-Vit C-Mn (GLUCOSAMINE CHONDR 1500 COMPLX PO), Take  by mouth 2 times daily., Disp: , Rfl:      IBUPROFEN PO, Take  by mouth as needed., Disp: , Rfl:      mometasone (ELOCON) 0.1 % cream, Apply topically daily as needed Apply 1 dose topically daily as needed, Disp: 45 g, Rfl: 3     mometasone (NASONEX) 50 MCG/ACT spray, Spray 2 sprays into both nostrils daily, Disp: 3 Box, Rfl: 3     MULTIVITAMIN OR, once daily, Disp: , Rfl:      olopatadine (PATADAY) 0.2 % ophthalmic solution, Place 1 drop into both eyes daily, Disp: 1 Bottle, Rfl:  11    Allergies -   Allergies   Allergen Reactions     Flagyl [Metronidazole] Rash     Sulfa Drugs Rash       Social History -   Social History     Socioeconomic History     Marital status:      Spouse name: Not on file     Number of children: Not on file     Years of education: Not on file     Highest education level: Not on file   Occupational History     Occupation: pharmacist   Social Needs     Financial resource strain: Not on file     Food insecurity:     Worry: Not on file     Inability: Not on file     Transportation needs:     Medical: Not on file     Non-medical: Not on file   Tobacco Use     Smoking status: Never Smoker     Smokeless tobacco: Never Used   Substance and Sexual Activity     Alcohol use: Yes     Comment: occasional     Drug use: No     Sexual activity: Yes     Partners: Male     Birth control/protection: Surgical   Lifestyle     Physical activity:     Days per week: Not on file     Minutes per session: Not on file     Stress: Not on file   Relationships     Social connections:     Talks on phone: Not on file     Gets together: Not on file     Attends Catholic service: Not on file     Active member of club or organization: Not on file     Attends meetings of clubs or organizations: Not on file     Relationship status: Not on file     Intimate partner violence:     Fear of current or ex partner: Not on file     Emotionally abused: Not on file     Physically abused: Not on file     Forced sexual activity: Not on file   Other Topics Concern     Parent/sibling w/ CABG, MI or angioplasty before 65F 55M? Not Asked   Social History Narrative    March 26, 2018    ENVIRONMENTAL HISTORY: [place] The home is heated with a forced air. They do have central air conditioning. The patient's bedroom is furnished with hard jorge in bedroom, allergen mattress cover and allergen pillowcase cover. No pets inside the house. There is no  history of  "cockroach or mice infestation. There are no smokers in the house.  The house does have a damp basement, use a dehumidifier in the summer.    1961       Family History -   Family History   Problem Relation Age of Onset     Alcohol/Drug Father      C.A.D. Paternal Grandfather      Hypertension Paternal Grandfather      Cerebrovascular Disease Paternal Grandfather      Genetic Disorder Sister         Angelman's syndrome     Thyroid Disease Paternal Grandmother      Breast Cancer Other         aunt, maternal     Alzheimer Disease Other         aunt     Thyroid Disease Sister      Macular Degeneration No family hx of      Glaucoma No family hx of      Review of Systems - As per HPI and PMHx, otherwise 10+ comprehensive system review is negative.    Physical Exam  /81   Pulse 63   Ht 1.638 m (5' 4.5\")   Wt 73 kg (161 lb)   LMP 07/05/2009   SpO2 99%   BMI 27.21 kg/m    General - The patient is in no distress. Alert and oriented to person and place, answers questions and cooperates with examination appropriately.   Voice and Breathing - The patient was breathing comfortably without the use of accessory muscles. There was no wheezing, stridor, or stertor.  The patients voice was clear and strong.  Eyes - Extraocular movements intact.  Sclera were not icteric or injected, conjunctiva were pink and moist.  Mouth - Examination of the oral cavity showed pink, healthy oral mucosa. No lesions or ulcerations noted.  The tongue was mobile and midline.  Throat - The walls of the oropharynx were smooth, symmetric, and had no lesions or ulcerations.  The tonsillar pillars and soft palate were symmetric.  The uvula was midline on elevation.   Nose - External contour is symmetric, no gross deflection or scars.  Nasal mucosa is pink and moist with no abnormal mucus.  The septum was midline and non-obstructive, turbinates of normal size and position.  No polyps, masses, or purulence noted on examination.  Neck -  Palpation of " the occipital, submental, submandibular, internal jugular chain, and supraclavicular nodes did not demonstrate any abnormal lymph nodes or masses. No parotid masses. Palpation of the thyroid was soft and smooth, with no nodules or goiter appreciated.  The trachea was mobile and midline.  Neurologic - CN II-XII are grossly intact, no focal neurologic deficits.  Cardiovascular - carotid pulses are 2+ bilaterally, regular rhythm       A/P - Bjorn Sykes is a 48 year old female with dysphagia. She points to her epigastric area. It's worse with solids. Had a family member that needed her esophagus dilated. She denies reflux. I recommend an EGD with dilation if indicated. We can discuss findings and other possible treatment options following EGD.         Dr. Edis Pierre  Otolaryngology  [facility]

## 2019-04-23 NOTE — PATIENT INSTRUCTIONS
General Scheduling Information  To schedule your CT/MRI scan, please contact [place] [facility] at [phone]   [address] [place] [address]    To schedule your Surgery, please contact our Specialty Schedulers at [phone]    ENT Clinic Locations Clinic Hours Telephone Number     [facility]ibeiro  [address] [place] [address]   Tuesday:       8:00am -- 4:00pm    Wednesday:  8:00am - 4:00pm   To schedule an appointment with   Dr. Pierre,   please contact our   Specialty Scheduling Department at:     [phone]       Jadiel Junior  [address] [place] [address]   Friday:          8:00am - 4:00pm         Urgent Care Locations Clinic Hours Telephone Numbers     Jadiel Tom  [address] [place] [address]     Monday-Friday:     11:00pm - 9:00pm    Saturday-Sunday:  9:00am - 5:00pm   [phone]     Jadiel Junior  [address] [place] [address]     Monday-Friday:      5:00pm - 9:00pm     Saturday-Sunday:  9:00am - 5:00pm   [phone]

## 2019-06-25 ENCOUNTER — HOSPITAL ENCOUNTER (OUTPATIENT)
Facility: AMBULATORY SURGERY CENTER | Age: 49
Discharge: HOME | End: 2019-06-25
Attending: SURGERY | Admitting: SURGERY
Payer: COMMERCIAL

## 2019-06-25 VITALS
RESPIRATION RATE: 16 BRPM | SYSTOLIC BLOOD PRESSURE: 126 MMHG | HEART RATE: 76 BPM | DIASTOLIC BLOOD PRESSURE: 87 MMHG | OXYGEN SATURATION: 98 % | TEMPERATURE: 97.3 F

## 2019-06-25 LAB
COLONOSCOPY: NORMAL
UPPER GI ENDOSCOPY: NORMAL

## 2019-06-25 PROCEDURE — G8907 PT DOC NO EVENTS ON DISCHARG: HCPCS

## 2019-06-25 PROCEDURE — 43239 EGD BIOPSY SINGLE/MULTIPLE: CPT | Mod: 51 | Performed by: SURGERY

## 2019-06-25 PROCEDURE — G8918 PT W/O PREOP ORDER IV AB PRO: HCPCS

## 2019-06-25 PROCEDURE — 45385 COLONOSCOPY W/LESION REMOVAL: CPT | Mod: PT | Performed by: SURGERY

## 2019-06-25 PROCEDURE — 45381 COLONOSCOPY SUBMUCOUS NJX: CPT

## 2019-06-25 PROCEDURE — 43239 EGD BIOPSY SINGLE/MULTIPLE: CPT

## 2019-06-25 PROCEDURE — 88305 TISSUE EXAM BY PATHOLOGIST: CPT | Performed by: SURGERY

## 2019-06-25 PROCEDURE — 99153 MOD SED SAME PHYS/QHP EA: CPT | Mod: 59 | Performed by: SURGERY

## 2019-06-25 PROCEDURE — 45381 COLONOSCOPY SUBMUCOUS NJX: CPT | Mod: PT | Performed by: SURGERY

## 2019-06-25 PROCEDURE — 45385 COLONOSCOPY W/LESION REMOVAL: CPT

## 2019-06-25 PROCEDURE — 99152 MOD SED SAME PHYS/QHP 5/>YRS: CPT | Mod: 59 | Performed by: SURGERY

## 2019-06-25 RX ORDER — FENTANYL CITRATE 50 UG/ML
INJECTION, SOLUTION INTRAMUSCULAR; INTRAVENOUS PRN
Status: DISCONTINUED | OUTPATIENT
Start: 2019-06-25 | End: 2019-06-25 | Stop reason: HOSPADM

## 2019-06-25 RX ORDER — LIDOCAINE 40 MG/G
CREAM TOPICAL
Status: DISCONTINUED | OUTPATIENT
Start: 2019-06-25 | End: 2019-06-26 | Stop reason: HOSPADM

## 2019-06-25 RX ORDER — ONDANSETRON 2 MG/ML
4 INJECTION INTRAMUSCULAR; INTRAVENOUS
Status: DISCONTINUED | OUTPATIENT
Start: 2019-06-25 | End: 2019-06-26 | Stop reason: HOSPADM

## 2019-06-27 LAB — COPATH REPORT: NORMAL

## 2019-06-28 ENCOUNTER — TELEPHONE (OUTPATIENT)
Dept: OTOLARYNGOLOGY | Facility: CLINIC | Age: 49
End: 2019-06-28

## 2019-06-28 DIAGNOSIS — K20.0 EOSINOPHILIC ESOPHAGITIS: Primary | ICD-10-CM

## 2019-06-28 DIAGNOSIS — K20.0 EOSINOPHILIC ESOPHAGITIS: ICD-10-CM

## 2019-06-28 DIAGNOSIS — R13.10 DYSPHAGIA, UNSPECIFIED TYPE: Primary | ICD-10-CM

## 2019-06-28 NOTE — PROGRESS NOTES
I spoke with Mary on the phone.  Her EGD findings and biopsy results were consistent with eosinophilic esophagitis.  I will refer her to gastroenterology at the [facility] for treatment recommendations.

## 2019-07-05 ENCOUNTER — TRANSFERRED RECORDS (OUTPATIENT)
Dept: HEALTH INFORMATION MANAGEMENT | Facility: CLINIC | Age: 49
End: 2019-07-05

## 2019-09-09 ENCOUNTER — DOCUMENTATION ONLY (OUTPATIENT)
Dept: CARE COORDINATION | Facility: CLINIC | Age: 49
End: 2019-09-09

## 2019-09-12 ENCOUNTER — TRANSFERRED RECORDS (OUTPATIENT)
Dept: HEALTH INFORMATION MANAGEMENT | Facility: CLINIC | Age: 49
End: 2019-09-12

## 2019-09-30 ENCOUNTER — TRANSFERRED RECORDS (OUTPATIENT)
Dept: HEALTH INFORMATION MANAGEMENT | Facility: CLINIC | Age: 49
End: 2019-09-30

## 2019-10-10 NOTE — TELEPHONE ENCOUNTER
RECORDS RECEIVED FROM: Internal    DATE RECEIVED: 10/29/19    NOTES STATUS DETAILS   OFFICE NOTE from referring provider Internal Internal referral    OFFICE NOTE from other specialist Received [facility] recs scannd in epic   DISCHARGE SUMMARY from hospital N/A    OPERATIVE REPORT N/A    MEDICATION LIST N/A         ENDOSCOPY  Received 9/12/19   COLONOSCOPY Internal 6/25/19   ERCP N/A    EUS N/A    STOOL TESTING N/A    PERTINENT LABS Internal    PATHOLOGY REPORTS (RELATED) Internal 6/25/19   IMAGING (CT, MRI, EGD) N/A      REFERRAL INFORMATION    Date referral was placed: 10/29/19   Date all records received: N/A   Date records were scanned into Epic: N/A   Date records were sent to Provider to review: N/A   Date and recommendation received from provider:  LETTER SENT  SCHEDULE APPOINTMENT   Date patient was contacted to schedule: 6/28/19

## 2019-10-29 ENCOUNTER — PRE VISIT (OUTPATIENT)
Dept: GASTROENTEROLOGY | Facility: CLINIC | Age: 49
End: 2019-10-29

## 2019-10-31 ENCOUNTER — TRANSFERRED RECORDS (OUTPATIENT)
Dept: HEALTH INFORMATION MANAGEMENT | Facility: CLINIC | Age: 49
End: 2019-10-31

## 2019-11-09 ENCOUNTER — HEALTH MAINTENANCE LETTER (OUTPATIENT)
Age: 49
End: 2019-11-09

## 2019-12-23 ENCOUNTER — DOCUMENTATION ONLY (OUTPATIENT)
Dept: OPHTHALMOLOGY | Facility: CLINIC | Age: 49
End: 2019-12-23

## 2020-01-09 ENCOUNTER — TRANSFERRED RECORDS (OUTPATIENT)
Dept: HEALTH INFORMATION MANAGEMENT | Facility: CLINIC | Age: 50
End: 2020-01-09

## 2020-02-24 ENCOUNTER — TRANSFERRED RECORDS (OUTPATIENT)
Dept: HEALTH INFORMATION MANAGEMENT | Facility: CLINIC | Age: 50
End: 2020-02-24

## 2020-06-19 ENCOUNTER — TRANSFERRED RECORDS (OUTPATIENT)
Dept: HEALTH INFORMATION MANAGEMENT | Facility: CLINIC | Age: 50
End: 2020-06-19

## 2020-08-06 ENCOUNTER — TRANSFERRED RECORDS (OUTPATIENT)
Dept: HEALTH INFORMATION MANAGEMENT | Facility: CLINIC | Age: 50
End: 2020-08-06

## 2020-10-08 ENCOUNTER — TRANSFERRED RECORDS (OUTPATIENT)
Dept: HEALTH INFORMATION MANAGEMENT | Facility: CLINIC | Age: 50
End: 2020-10-08

## 2020-10-28 ENCOUNTER — VIRTUAL VISIT (OUTPATIENT)
Dept: FAMILY MEDICINE | Facility: OTHER | Age: 50
End: 2020-10-28

## 2020-10-28 NOTE — PROGRESS NOTES
"Date: 10/28/2020 14:24:42  Clinician: Allie Fernandez  Clinician NPI: [id number]  Patient: Bjorn Sykes  Patient : 1970  Patient Address: [address] [place], [place] [address] [address]  Patient Phone: [phone]  Visit Protocol: URI  Patient Summary:  Bjorn is a 50 year old ( : 1970 ) female who initiated a [facility] Visit for COVID-19 (Coronavirus) evaluation and screening. When asked the question \"Please sign me up to receive news, health information and promotions. \", Bjorn responded \"No\".    Bjorn states her symptoms started 1-2 days ago.   Her symptoms consist of ear pain, a headache, enlarged lymph nodes, myalgia, malaise, a sore throat, diarrhea, and rhinitis.   Symptom details     Nasal secretions: The color of her mucus is clear.    Sore throat: Bjorn reports having moderate throat pain (4-6 on a 10 point pain scale), has exudate on her tonsils, and can swallow liquids. The lymph nodes in her neck are enlarged. A rash has not appeared on the skin since the sore throat started.     Headache: She states the headache is moderate (4-6 on a 10 point pain scale).      Bjorn denies having wheezing, fever, cough, nasal congestion, nausea, facial pain or pressure, chills, teeth pain, ageusia, anosmia, and vomiting. She also denies having recent facial or sinus surgery in the past 60 days and taking antibiotic medication in the past month. She is not experiencing dyspnea.   Precipitating events  Within the past week, Bjorn has not been exposed to someone with strep throat. She has not recently been exposed to someone with influenza. Bjorn has been in close contact with the following high risk individuals: children under the age of 5 and adults 65 or older.   Pertinent COVID-19 (Coronavirus) information  Bjorn does not work or volunteer as healthcare worker or a . In the past 14 days, Bjorn has not worked or volunteered at a healthcare facility or group living setting. "   In the past 14 days, she also has not lived in a congregate living setting.   Bjorn has had a close contact with a laboratory-confirmed COVID-19 patient within 14 days of symptom onset. She was not exposed at her work. Date Bjorn was exposed to the laboratory-confirmed COVID-19 patient: 10/23/2020   Additional information about contact with COVID-19 (Coronavirus) patient as reported by the patient (free text): my niece while she was visiting my home with her child    Since December 2019, Bjorn has not been diagnosed with lab-confirmed COVID-19 test and has not had upper respiratory infection or influenza-like illness.   Triage Point(s) temporarily suspended for COVID-19 (Coronavirus) screening  Bjorn reported the following symptoms which were previously protocol referral points. These protocol referral points have temporarily been removed for purposes of COVID-19 (Coronavirus) screening.   Meets at least 3/5 centor score criteria     Swollen lymph nodes    Exudate on tonsils    Absence of cough         Pertinent medical history  Bjorn does not get yeast infections when she takes antibiotics.   Bjorn does not need a return to work/school note.   Weight: 155 lbs   Bjorn does not smoke or use smokeless tobacco.   Weight: 155 lbs    MEDICATIONS: Calcium Citrate + D oral, Glucosamine-Chondroitin-MSM Complex oral, Pain Reliever (acetaminophen) oral, ibuprofen oral, Complete Multivitamin-Multimineral oral, mometasone topical, mometasone nasal, Pataday ophthalmic (eye), ALLERGIES: Sulfa (Sulfonamide Antibiotics), Flagyl  Clinician Response:  Dear Bjorn,   Your symptoms show that you may have coronavirus (COVID-19). This illness can cause fever, cough and trouble breathing. Many people get a mild case and get better on their own. Some people can get very sick.  Because you also reported sore throat I would like to also test you for Strep Throat to determine if we need to treat you for that as well.  What  "should I do?  We would like to test you for the COVID-19 virus and the streptococcus bacteria.   1. Please call [phone] to schedule your visit. Explain that you were referred by [facility] to have a COVID-19 test and a Strep test. Be ready to share your [facility] visit ID number.  The following will serve as your written order for the COVID Test, ordered by me, for the indication of suspected COVID [Z20.828]: The test will be ordered in [email or web address], our electronic health record, after you are scheduled. It will show as ordered and authorized by Amilcar Sanchez MD.  Order: COVID-19 (Coronavirus) PCR for SYMPTOMATIC testing from [facility].  The following will serve as your written order for this Strep Test, ordered by me, for the indication of suspected Strep throat [R07.0]: The test will be ordered in [email or web address], our electronic health record, after you are scheduled. It will show as ordered and authorized by Amilcar Sanchez MD.  Order: Streptococcus A Rapid Screen with reflex to PCR testing from [facility].  2. When it's time for your COVID and Strep test:  Stay at least 6 feet away from others. (If someone will drive you to your test, stay in the backseat, as far away from the  as you can.)  Cover your mouth and nose with a mask, tissue or washcloth.  Go straight to the testing site. Don't make any stops on the way there or back.  3.Starting now: Stay home and away from others (self-isolate) until:  You've had no fever---and no medicine that reduces fever---for one full day (24 hours). And...  Your other symptoms have gotten better. For example, your cough or breathing has improved. And...  At least 10 days have passed since your symptoms started.  During this time, don't leave the house except for testing or medical care.  Stay in your own room, even for meals. Use your own bathroom if you can.  Stay away from others in your home. No hugging, kissing or shaking hands. No visitors.  Don't go to work, school or anywhere else.  Clean \"high " "touch\" surfaces often (doorknobs, counters, handles, etc.). Use a household cleaning spray or wipes. You'll find a full list of  on the EPA website: www.epa.gov/pesticide-registration/list-n-disinfectants-use-against-sars-cov-2.  Cover your mouth and nose with a mask, tissue or washcloth to avoid spreading germs.  Wash your hands and face often. Use soap and water.  Caregivers in these groups are at risk for severe illness due to COVID-19:  o People 65 years and older  o People who live in a nursing home or long-term care facility  o People with chronic disease (lung, heart, cancer, diabetes, kidney, liver, immunologic)  o People who have a weakened immune system, including those who:  Are in cancer treatment  Take medicine that weakens the immune system, such as corticosteroids  Had a bone marrow or organ transplant  Have an immune deficiency  Have poorly controlled HIV or AIDS  Are obese (body mass index of 40 or higher)  Smoke regularly  o Caregivers should wear gloves while washing dishes, handling laundry and cleaning bedrooms and bathrooms.  o Use caution when washing and drying laundry: Don't shake dirty laundry, and use the warmest water setting that you can.  o For more tips, go to www.cdc.gov/coronavirus/2019-ncov/downloads/10Things.pdf.  4.Sign up for [employer]atch[employer]!. We know it's scary to hear that you might have COVID-19. We want to track your symptoms to make sure you're okay over the next 2 weeks. Please look for an email from [employer]!---this is a free, online program that we'll use to keep in touch. To sign up, follow the link in the email. Learn more at [email or web address]  How can I take care of myself?  Get lots of rest. Drink extra fluids (unless a doctor has told you not to).  Take Tylenol (acetaminophen) for fever or pain. If you have liver or kidney problems, ask your family doctor if it's okay to take Tylenol.  Adults can take either:  650 mg (two 325 mg pills) every 4 " to 6 hours, or...  1,000 mg (two 500 mg pills) every 8 hours as needed.  Note: Don't take more than 3,000 mg in one day. Acetaminophen is found in many medicines (both prescribed and over-the-counter medicines). Read all labels to be sure you don't take too much.  For children, check the Tylenol bottle for the right dose. The dose is based on the child's age or weight.  If you have other health problems (like cancer, heart failure, an organ transplant or severe kidney disease): Call your specialty clinic if you don't feel better in the next 2 days.  Know when to call 911. Emergency warning signs include:  Trouble breathing or shortness of breath  Pain or pressure in the chest that doesn't go away  Feeling confused like you haven't felt before, or not being able to wake up  Bluish-colored lips or face.  Where can I get more information?  [facility] -- About COVID-19: [email or web address]  CDC -- What to Do If You're Sick: www.cdc.gov/coronavirus/2019-ncov/about/steps-when-sick.html  CDC -- Ending Home Isolation: www.cdc.gov/coronavirus/2019-ncov/hcp/disposition-in-home-patients.html  CDC -- Caring for Someone: www.cdc.gov/coronavirus/2019-ncov/if-you-are-sick/care-for-someone.html  [facility] -- Interim Guidance for Hospital Discharge to Home: www.health.[facility]/diseases/coronavirus/hcp/hospdischarge.pdf  [facility] clinical trials (COVID-19 research studies): clinicalaffairs.[facility]/umn-clinical-trials  Below are the COVID-19 hotlines at the [facility] ([facility]). Interpreters are available.  For health questions: Call 072-912-0543 or 1-810.999.7893 (7 a.m. to 7 p.m.)  For questions about schools and childcare: Call 087-253-3791 or 1-562.763.6543 (7 a.m. to 7 p.m.)       Diagnosis: Contact with and (suspected) exposure to other viral communicable diseases  Diagnosis ICD: Z20.828

## 2020-11-01 DIAGNOSIS — J02.9 SORE THROAT: ICD-10-CM

## 2020-11-01 DIAGNOSIS — Z20.822 ENCOUNTER FOR LABORATORY TESTING FOR COVID-19 VIRUS: Primary | ICD-10-CM

## 2020-11-01 LAB
DEPRECATED S PYO AG THROAT QL EIA: NEGATIVE
SPECIMEN SOURCE: NORMAL
SPECIMEN SOURCE: NORMAL
STREP GROUP A PCR: NOT DETECTED

## 2020-11-01 PROCEDURE — U0003 INFECTIOUS AGENT DETECTION BY NUCLEIC ACID (DNA OR RNA); SEVERE ACUTE RESPIRATORY SYNDROME CORONAVIRUS 2 (SARS-COV-2) (CORONAVIRUS DISEASE [COVID-19]), AMPLIFIED PROBE TECHNIQUE, MAKING USE OF HIGH THROUGHPUT TECHNOLOGIES AS DESCRIBED BY CMS-2020-01-R: HCPCS | Performed by: FAMILY MEDICINE

## 2020-11-01 PROCEDURE — 87651 STREP A DNA AMP PROBE: CPT | Performed by: FAMILY MEDICINE

## 2020-11-01 PROCEDURE — 99N1174 PR STATISTIC STREP A RAPID: Performed by: FAMILY MEDICINE

## 2020-11-02 ENCOUNTER — MYC MEDICAL ADVICE (OUTPATIENT)
Dept: OBGYN | Facility: CLINIC | Age: 50
End: 2020-11-02

## 2020-11-02 LAB
SARS-COV-2 RNA SPEC QL NAA+PROBE: NOT DETECTED
SPECIMEN SOURCE: NORMAL

## 2020-11-02 NOTE — TELEPHONE ENCOUNTER
Pt had [facility] visit for COVID on 10/28/2020.    Pt requesting provider note stating she needs to quarantine until 11/9/2020.    RN routing to [facility] provider for advisement on note.    Vigrinie Garg RN on 11/2/2020 at 12:38 PM

## 2020-11-24 DIAGNOSIS — H10.13 ALLERGIC CONJUNCTIVITIS, BILATERAL: ICD-10-CM

## 2020-11-24 RX ORDER — OLOPATADINE HYDROCHLORIDE 2 MG/ML
1 SOLUTION/ DROPS OPHTHALMIC DAILY
Qty: 1 BOTTLE | Refills: 3 | Status: SHIPPED | OUTPATIENT
Start: 2020-11-24

## 2020-11-24 NOTE — TELEPHONE ENCOUNTER
Recommend refilling drops per last visit note with Dr. Bang on 4/10/19: Continue using Pataday both eyes twice daily as needed. Will have pharmacy remind patient to make appointment for further refills.

## 2020-12-01 DIAGNOSIS — J45.20 ASTHMA, INTERMITTENT, UNCOMPLICATED: ICD-10-CM

## 2020-12-01 NOTE — TELEPHONE ENCOUNTER
"Requested Prescriptions   Pending Prescriptions Disp Refills     mometasone (NASONEX) 50 MCG/ACT nasal spray 3 Box 3     Sig: Spray 2 sprays into both nostrils daily       Nasal Allergy Protocol Failed - 12/1/2020 11:47 AM        Failed - Recent (12 mo) or future (30 days) visit within the authorizing provider's specialty     Patient has had an office visit with the authorizing provider or a provider within the authorizing providers department within the previous 12 mos or has a future within next 30 days. See \"Patient Info\" tab in inbasket, or \"Choose Columns\" in Meds & Orders section of the refill encounter.              Passed - Patient is age 12 or older        Passed - Medication is active on med list           Routing refill request to provider for review/approval because:  Patient needs to be seen because it has been more than 1 year since last office visit.  Last OV was on 4/23/19  Prescribed for asthma, not sure if Dr. Ron wants to continue to manage pt's asthma.    Malaika Braden RN          "

## 2020-12-02 RX ORDER — MOMETASONE FUROATE MONOHYDRATE 50 UG/1
2 SPRAY, METERED NASAL DAILY
Qty: 3 BOX | Refills: 3 | Status: SHIPPED | OUTPATIENT
Start: 2020-12-02 | End: 2022-01-25

## 2020-12-06 ENCOUNTER — HEALTH MAINTENANCE LETTER (OUTPATIENT)
Age: 50
End: 2020-12-06

## 2021-01-07 ENCOUNTER — TRANSFERRED RECORDS (OUTPATIENT)
Dept: HEALTH INFORMATION MANAGEMENT | Facility: CLINIC | Age: 51
End: 2021-01-07

## 2021-01-12 ENCOUNTER — ANCILLARY PROCEDURE (OUTPATIENT)
Dept: MAMMOGRAPHY | Facility: CLINIC | Age: 51
End: 2021-01-12
Attending: OBSTETRICS & GYNECOLOGY
Payer: COMMERCIAL

## 2021-01-12 ENCOUNTER — OFFICE VISIT (OUTPATIENT)
Dept: OBGYN | Facility: CLINIC | Age: 51
End: 2021-01-12
Payer: COMMERCIAL

## 2021-01-12 VITALS
OXYGEN SATURATION: 100 % | WEIGHT: 158 LBS | DIASTOLIC BLOOD PRESSURE: 89 MMHG | HEART RATE: 73 BPM | BODY MASS INDEX: 26.98 KG/M2 | SYSTOLIC BLOOD PRESSURE: 130 MMHG | HEIGHT: 64 IN

## 2021-01-12 DIAGNOSIS — Z12.31 VISIT FOR SCREENING MAMMOGRAM: ICD-10-CM

## 2021-01-12 DIAGNOSIS — Z85.43 HISTORY OF OVARIAN CANCER: ICD-10-CM

## 2021-01-12 DIAGNOSIS — Z01.419 ENCOUNTER FOR GYNECOLOGICAL EXAMINATION WITHOUT ABNORMAL FINDING: Primary | ICD-10-CM

## 2021-01-12 PROCEDURE — 77067 SCR MAMMO BI INCL CAD: CPT | Mod: TC | Performed by: RADIOLOGY

## 2021-01-12 PROCEDURE — 99396 PREV VISIT EST AGE 40-64: CPT | Performed by: OBSTETRICS & GYNECOLOGY

## 2021-01-12 ASSESSMENT — MIFFLIN-ST. JEOR: SCORE: 1320.65

## 2021-01-12 NOTE — PROGRESS NOTES
Bjorn Sykes is a 50 year old female , who presents for annual exam.   No unusual bleeding, no incontinence, or unusual discharge.     Last cholesterol:   Recent Labs   Lab Test 16  0818   CHOL 222*   HDL 76   *   TRIG 63     Past Medical History:   Diagnosis Date     Adnexal mass 2009     Diagnostic skin and sensitization tests  skin tests per [facility] pos. for M/T only.  skin tests [facility] pos. for: dog/DM/M only (T not retested)     Dysmenorrhea     IBU Helps     Intermittent asthma      Migraine      Osteopenia     [facility]     Ovarian carcinoma (H) 2009    LEFT side   Dr.Jonson Curt PANIAGUA     Plantar warts s-       Past Surgical History:   Procedure Laterality Date     APPENDECTOMY OPEN  2009    see above     BUNIONECTOMY  10/16/2012    Procedure: BUNIONECTOMY;  Right Theo bunionectomy;  Surgeon: Daniel Arias MD;  [facility]     COLONOSCOPY WITH CO2 INSUFFLATION N/A 2019    Procedure: COLONOSCOPY, WITH CO2 INSUFFLATION;  Surgeon: Toro Dover MD;  [facility]     COMBINED ESOPHAGOSCOPY, GASTROSCOPY, DUODENOSCOPY (EGD) WITH CO2 INSUFFLATION N/A 2019    Procedure: ESOPHAGOGASTRODUODENOSCOPY, WITH CO2 INSUFFLATION;  Surgeon: Toro Dover MD;  [facility]: [facility]     D & C  2007    cysts in uterus     ESOPHAGOSCOPY, GASTROSCOPY, DUODENOSCOPY (EGD), COMBINED N/A 2019    Procedure: Esophagogastroduodenoscopy, With Biopsy;  Surgeon: Troo Dover MD;  [facility]     HYSTERECTOMY TOTAL ABDOMINAL, BILATERAL SALPINGO-OOPHORECTOMY, COMBINED  2009    appy also     HYSTERECTOMY, PAP NO LONGER INDICATED         OB History    Para Term  AB Living   0 0 0 0 0 0   SAB TAB Ectopic Multiple Live Births   0 0 0 0 0       Gyn History:  Gynecological History         Patient's last menstrual period was 2009.     no STD/no PID/no IUD      history of abnormal pap smear:  no  Last pap:   Lab Results    Component Value Date    PAP NIL 01/12/2016           Current Outpatient Medications   Medication Sig Dispense Refill     albuterol (PROAIR HFA/PROVENTIL HFA/VENTOLIN HFA) 108 (90 BASE) MCG/ACT Inhaler Inhale 2 puffs into the lungs every 4 hours as needed for shortness of breath / dyspnea 1 Inhaler 2     ascorbic acid (VITAMIN C) 500 MG tablet Take 500 mg by mouth as needed.       Calcium Carbonate-Vitamin D (CALCIUM 500 + D PO) Take by mouth 2 times daily       cetirizine (ZYRTEC) 10 MG tablet Take 1 tablet (10 mg) by mouth daily 90 tablet 3     Glucosamine-Chondroit-Vit C-Mn (GLUCOSAMINE CHONDR 1500 COMPLX PO) Take  by mouth 2 times daily.       IBUPROFEN PO Take  by mouth as needed.       mometasone (ELOCON) 0.1 % cream Apply topically daily as needed Apply 1 dose topically daily as needed 45 g 3     mometasone (NASONEX) 50 MCG/ACT nasal spray Spray 2 sprays into both nostrils daily 3 Box 3     MULTIVITAMIN OR once daily       olopatadine (PATADAY) 0.2 % ophthalmic solution Place 1 drop into both eyes daily 1 Bottle 3       Allergies   Allergen Reactions     Flagyl [Metronidazole] Rash     Sulfa Drugs Rash       Social History     Socioeconomic History     Marital status:      Spouse name: Not on file     Number of children: Not on file     Years of education: Not on file     Highest education level: Not on file   Occupational History     Occupation: pharmacist   Social Needs     Financial resource strain: Not on file     Food insecurity     Worry: Not on file     Inability: Not on file     Transportation needs     Medical: Not on file     Non-medical: Not on file   Tobacco Use     Smoking status: Never Smoker     Smokeless tobacco: Never Used   Substance and Sexual Activity     Alcohol use: Yes     Comment: occasional     Drug use: No     Sexual activity: Yes     Partners: Male     Birth control/protection: Surgical   Lifestyle     Physical activity     Days per week: Not on file     Minutes per session:  "Not on file     Stress: Not on file   Relationships     Social connections     Talks on phone: Not on file     Gets together: Not on file     Attends Cheondoism service: Not on file     Active member of club or organization: Not on file     Attends meetings of clubs or organizations: Not on file     Relationship status: Not on file     Intimate partner violence     Fear of current or ex partner: Not on file     Emotionally abused: Not on file     Physically abused: Not on file     Forced sexual activity: Not on file   Other Topics Concern     Parent/sibling w/ CABG, MI or angioplasty before 65F 55M? Not Asked   Social History Narrative    March 26, 2018    ENVIRONMENTAL HISTORY: [place] The home is heated with a forced air. They do have central air conditioning. The patient's bedroom is furnished with hard jorge in bedroom, allergen mattress cover and allergen pillowcase cover. No pets inside the house. There is no  history of cockroach or mice infestation. There are no smokers in the house.  The house does have a damp basement, use a dehumidifier in the summer.    1961       Family History   Problem Relation Age of Onset     Alcohol/Drug Father      C.A.D. Paternal Grandfather      Hypertension Paternal Grandfather      Cerebrovascular Disease Paternal Grandfather      Genetic Disorder Sister         Angelman's syndrome     Thyroid Disease Paternal Grandmother      Breast Cancer Other         aunt, maternal     Alzheimer Disease Other         aunt     Thyroid Disease Sister      Macular Degeneration No family hx of      Glaucoma No family hx of          ROS:  All negative except as above.      EXAM:  /89 (BP Location: Right arm, Cuff Size: Adult Regular)   Pulse 73   Ht 1.632 m (5' 4.25\")   Wt 71.7 kg (158 lb)   LMP 07/05/2009   SpO2 100%   BMI 26.91 kg/m    General:  WNWD female, NAD  Alert  Oriented x 3  Gait:  Normal  Skin:  Normal skin " turgor  Neurologic:  CN grossly intact, good sensation.    HEENT:  NC/AT, EOMI  Neck:  No masses palpated, symmetrical, carotids +2/4, no bruits heard  Heart:  RRR  Lungs:  Clear   Breasts:  Symmetrical, no dimpling noted, no masses palpated, no discharge expressed  Abdomen:  Non-tender, non-distended.  Vulva: No external lesions, normal hair distribution, no adenopathy  BUS:  Normal, no masses noted  Urethra:  No hypermobility noted  Urethral meatus:  No masses noted  Vagina: Moist, pink, no abnormal discharge, well rugated, no lesions  Cervix: absent   Uterus: Absent   Ovaries: No masses palpated.  Non tender.   Perianal:  No masses noted.    Rectal exam:  Declined due to having recent GI evaluation   Extremities:  No clubbing, cyanosis, or edema      ASSESSMENT/PLAN   Annual examination   She will call about medications refill as needed.   Personal history of ovarian cancer.   reviewed and the goals and limitations of testing.  She will hold on it at this time as it has been over 10 years.  Should she want to have this checked, she will call and I will place a future order.   Low fat diet, weight bearing exercises and self breast exams on a monthly basis have been recommended.  I have discussed with patient the risks, benefits, medications, treatment options and modalities.   I have instructed the patient to call or schedule a follow-up appointment if any problems.

## 2021-03-18 ENCOUNTER — TELEPHONE (OUTPATIENT)
Dept: OBGYN | Facility: CLINIC | Age: 51
End: 2021-03-18

## 2021-03-18 NOTE — TELEPHONE ENCOUNTER
RN received fax from [employer] stating pt received COVID vaccine on 3/15/2021. RN entered vaccine into immunization hx.    RN placed form in STAT scanning.    Virginie Garg RN on 3/18/2021 at 9:58 AM

## 2021-07-16 ENCOUNTER — E-VISIT (OUTPATIENT)
Dept: URGENT CARE | Facility: CLINIC | Age: 51
End: 2021-07-16
Payer: COMMERCIAL

## 2021-07-16 DIAGNOSIS — J02.9 SORE THROAT: ICD-10-CM

## 2021-07-16 DIAGNOSIS — Z20.822 SUSPECTED COVID-19 VIRUS INFECTION: ICD-10-CM

## 2021-07-16 LAB — DEPRECATED S PYO AG THROAT QL EIA: NEGATIVE

## 2021-07-16 PROCEDURE — 99421 OL DIG E/M SVC 5-10 MIN: CPT | Performed by: PHYSICIAN ASSISTANT

## 2021-07-16 NOTE — PATIENT INSTRUCTIONS
Dear Bjorn Sykes,    Your symptoms show that you may have coronavirus (COVID-19). This illness can cause fever, cough and trouble breathing. Many people get a mild case and get better on their own. Some people can get very sick.    Because you also reported sore throat I would like to also test you for Strep Throat to determine if we need to treat you for that as well.    What should I do?  We would like to test you for Covid-19 virus and Strep Throat. I have placed orders for these tests.   To schedule: go to your [employer] home page and scroll down to the section that says  You have an appointment that needs to be scheduled  and click the large green button that says  Schedule Now  and follow the steps to find the next available openings. It is important that when you are asked what the reason for your appointment is that you mention you need BOTH Covid and Strep tests.    If you are unable to complete these [employer] scheduling steps, please call [phone] to schedule your testing.     Return to work/school/ guidance:   Please let your workplace manager and staffing office know when your quarantine ends     We can t give you an exact date as it depends on the above. You can calculate this on your own or work with your manager/staffing office to calculate this. (For example if you were exposed on 10/4, you would have to quarantine for 14 full days. That would be through 10/18. You could return on 10/19.)      If you receive a positive COVID-19 test result, follow the guidance of the those who are giving you the results. Usually the return to work is 10 (or in some cases 20 days from symptom onset.) If you work at [employer] [facility] [employer], you must also be cleared by Employee Occupational Health and Safety to return to work.        If you receive a negative COVID-19 test result and did not have a high risk exposure to someone with a known positive COVID-19 test, you can return to work once you're free of  fever for 24 hours without fever-reducing medication and your symptoms are improving or resolved.      If you receive a negative COVID-19 test and If you had a high risk exposure to someone who has tested positive for COVID-19 then you can return to work 14 days after your last contact with the positive individual    Note: If you have ongoing exposure to the covid positive person, this quarantine period may be more than 14 days. (For example, if you are continued to be exposed to your child who tested positive and cannot isolate from them, then the quarantine of 7-14 days can't start until your child is no longer contagious. This is typically 10 days from onset of the child's symptoms. So the total duration may be 17-24 days in this case.)    Sign up for [email or web address].   We know it's scary to hear that you might have COVID-19. We want to track your symptoms to make sure you're okay over the next 2 weeks. Please look for an email from [email or web address]--this is a free, online program that we'll use to keep in touch. To sign up, follow the link in the email you will receive. Learn more at [email or web address]    How can I take care of myself?    Get lots of rest. Drink extra fluids (unless a doctor has told you not to)    Take Tylenol (acetaminophen) or ibuprofen for fever or pain. If you have liver or kidney problems, ask your family doctor if it's okay to take Tylenol o ibuprofen    If you have other health problems (like cancer, heart failure, an organ transplant or severe kidney disease): Call your specialty clinic if you don't feel better in the next 2 days.    Know when to call 911. Emergency warning signs include:  o Trouble breathing or shortness of breath  o Pain or pressure in the chest that doesn't go away  o Feeling confused like you haven't felt before, or not being able to wake up  o Bluish-colored lips or face    Where can I get more information?  [facility] - About COVID-19:    [email or web address]    CDC - What to Do If You're Sick:   www.cdc.gov/coronavirus/2019-ncov/about/steps-when-sick.html    July 16, 2021  RE:  Bjorn Sykes                                                                                                                  [address] [place] [address]      To whom it may concern:    I evaluated Bjorn Sykes on July 16, 2021. Bjorn Sykes should be excused from work/school.     They should let their workplace manager and staffing office know when their quarantine ends.    We can not give an exact date as it depends on the information below. They can calculate this on their own or work with their manager/staffing office to calculate this. (For example if they were exposed on 10/04, they would have to quarantine for 14 full days. That would be through 10/18. They could return on 10/19.)    Quarantine Guidelines:      If patient receives a positive COVID-19 test result, they should follow the guidance of those who are giving the results. Usually the return to work is 10 (or in some cases 20 days from symptom onset.) If they work at [employer], they must be cleared by Employee Occupational Health and Safety to return to work.        If patient receives a negative COVID-19 test result and did not have a high risk exposure to someone with a known positive COVID-19 test, they can return to work once they're free of fever for 24 hours without fever-reducing medication and their symptoms are improving or resolved.      If patient receives a negative COVID-19 test and if they had a high risk exposure to someone who has tested positive for COVID-19 then they can return to work 14 days after their last contact with the positive individual    Note: If there is ongoing exposure to the covid positive person, this quarantine period may be longer than 14 days. (For example, if they are continually exposed to their child, who tested  positive and cannot isolate from them, then the quarantine of 7-14 days can't start until their child is no longer contagious. This is typically 10 days from onset to the child's symptoms. So the total duration may be 17-24 days in this case.)     Sincerely,  Brenna Romo PA-C

## 2021-07-17 LAB — GROUP A STREP BY PCR: NOT DETECTED

## 2021-07-18 LAB — SARS-COV-2 RNA RESP QL NAA+PROBE: NEGATIVE

## 2021-09-26 ENCOUNTER — HEALTH MAINTENANCE LETTER (OUTPATIENT)
Age: 51
End: 2021-09-26

## 2021-10-25 ENCOUNTER — OFFICE VISIT (OUTPATIENT)
Dept: OPHTHALMOLOGY | Facility: CLINIC | Age: 51
End: 2021-10-25
Payer: COMMERCIAL

## 2021-10-25 DIAGNOSIS — H52.4 PRESBYOPIA: ICD-10-CM

## 2021-10-25 DIAGNOSIS — H10.13 ALLERGIC CONJUNCTIVITIS OF BOTH EYES: ICD-10-CM

## 2021-10-25 DIAGNOSIS — Z01.00 EXAMINATION OF EYES AND VISION: Primary | ICD-10-CM

## 2021-10-25 DIAGNOSIS — H18.513 CORNEAL GUTTATA OF BOTH EYES: ICD-10-CM

## 2021-10-25 PROCEDURE — 92015 DETERMINE REFRACTIVE STATE: CPT | Performed by: OPHTHALMOLOGY

## 2021-10-25 PROCEDURE — 92014 COMPRE OPH EXAM EST PT 1/>: CPT | Performed by: OPHTHALMOLOGY

## 2021-10-25 ASSESSMENT — TONOMETRY
IOP_METHOD: APPLANATION
OD_IOP_MMHG: 14
OS_IOP_MMHG: 16

## 2021-10-25 ASSESSMENT — CONF VISUAL FIELD
OD_NORMAL: 1
OS_NORMAL: 1

## 2021-10-25 ASSESSMENT — REFRACTION_MANIFEST
OS_SPHERE: PLANO
OD_CYLINDER: +1.00
OD_ADD: +2.00
OD_SPHERE: PLANO
OS_AXIS: 170
OS_CYLINDER: +1.00
OD_AXIS: 005
OS_ADD: +2.00

## 2021-10-25 ASSESSMENT — CUP TO DISC RATIO
OS_RATIO: 0.3
OD_RATIO: 0.2

## 2021-10-25 ASSESSMENT — REFRACTION_WEARINGRX
OD_SPHERE: PLANO
OS_CYLINDER: +0.75
OD_AXIS: 003
SPECS_TYPE: BIFOCAL
OD_CYLINDER: +1.00
OS_SPHERE: PLANO
OS_AXIS: 160
OS_ADD: +2.00
OD_ADD: +2.00

## 2021-10-25 ASSESSMENT — VISUAL ACUITY
OS_CC+: +3
OD_CC: 20/20
OD_CC+: -1
CORRECTION_TYPE: GLASSES
OS_CC: 20/25
METHOD: SNELLEN - LINEAR

## 2021-10-25 ASSESSMENT — SLIT LAMP EXAM - LIDS
COMMENTS: 1+ DERMATOCHALASIS - UPPER LID
COMMENTS: 1+ DERMATOCHALASIS - UPPER LID

## 2021-10-25 ASSESSMENT — EXTERNAL EXAM - LEFT EYE: OS_EXAM: NORMAL

## 2021-10-25 ASSESSMENT — EXTERNAL EXAM - RIGHT EYE: OD_EXAM: NORMAL

## 2021-10-25 NOTE — LETTER
10/25/2021         RE: Bjorn Sykes  [address] [place] [address]        Dear Colleague,    Thank you for referring your patient, Bjorn Sykes, to the [facility]. Please see a copy of my visit note below.     Current Eye Medications:  Olopatadine ever morning both eyes.     Subjective:  Comprehensive eye exam. 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 States she had a hard time getting used to her bifocals. Pt notes she was previously unable to tolerate progressive lenses. Pt notes a possible bump on the left upper eyelid margin.       Objective:  See Ophthalmology Exam.       Assessment:  Stable eye exam in patient with corneal guttata and seasonal allergies.      ICD-10-CM    1. Examination of eyes and vision  Z01.00    2. Presbyopia  H52.4    3. Corneal guttata of both eyes  H18.513    4. Allergic conjunctivitis of both eyes  H10.13         Plan:  Glasses Rx given - optional  Could try OTC +1.00 or +1.25 for over the shoulder viewing.  May use artificial tears up to 4 times daily both eyes.  (Refresh Tears, Systane Ultra/Balance, or Theratears).  Will continue to monitor corneal guttata.  Okay to continue Pataday drops as needed.  Call in June 2023 for an appointment in October 2023 for Complete Exam    Dr. Bang [phone]             Again, thank you for allowing me to participate in the care of your patient.        Sincerely,        Jamar Bang MD

## 2021-10-25 NOTE — PROGRESS NOTES
Current Eye Medications:  Olopatadine ever morning both eyes.     Subjective:  Comprehensive eye exam. 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 States she had a hard time getting used to her bifocals. Pt notes she was previously unable to tolerate progressive lenses. Pt notes a possible bump on the left upper eyelid margin.       Objective:  See Ophthalmology Exam.       Assessment:  Stable eye exam in patient with corneal guttata and seasonal allergies.      ICD-10-CM    1. Examination of eyes and vision  Z01.00    2. Presbyopia  H52.4    3. Corneal guttata of both eyes  H18.513    4. Allergic conjunctivitis of both eyes  H10.13         Plan:  Glasses Rx given - optional  Could try OTC +1.00 or +1.25 for over the shoulder viewing.  May use artificial tears up to 4 times daily both eyes.  (Refresh Tears, Systane Ultra/Balance, or Theratears).  Will continue to monitor corneal guttata.  Okay to continue Pataday drops as needed.  Call in June 2023 for an appointment in October 2023 for Complete Exam    Dr. Bang [phone]

## 2021-10-25 NOTE — PATIENT INSTRUCTIONS
Glasses Rx given - optional  Could try OTC +1.00 or +1.25 for over the shoulder viewing.  May use artificial tears up to 4 times daily both eyes.  (Refresh Tears, Systane Ultra/Balance, or Theratears).  Will continue to monitor corneal guttata.  Okay to continue Pataday drops as needed.  Call in June 2023 for an appointment in October 2023 for Complete Exam    Dr. Bang [phone]

## 2021-10-27 ENCOUNTER — OFFICE VISIT (OUTPATIENT)
Dept: DERMATOLOGY | Facility: CLINIC | Age: 51
End: 2021-10-27
Payer: COMMERCIAL

## 2021-10-27 VITALS — BODY MASS INDEX: 26.91 KG/M2 | OXYGEN SATURATION: 99 % | HEART RATE: 72 BPM | HEIGHT: 64 IN

## 2021-10-27 DIAGNOSIS — D17.0 LIPOMA OF FOREHEAD: Primary | ICD-10-CM

## 2021-10-27 DIAGNOSIS — D23.9 DERMAL NEVUS: ICD-10-CM

## 2021-10-27 DIAGNOSIS — L82.1 SEBORRHEIC KERATOSIS: ICD-10-CM

## 2021-10-27 DIAGNOSIS — L81.4 LENTIGO: ICD-10-CM

## 2021-10-27 DIAGNOSIS — D18.01 ANGIOMA OF SKIN: ICD-10-CM

## 2021-10-27 PROCEDURE — 99203 OFFICE O/P NEW LOW 30 MIN: CPT | Performed by: DERMATOLOGY

## 2021-10-27 NOTE — LETTER
10/27/2021         RE: Bjorn Sykes  [address] [place] [address]        Dear Colleague,    Thank you for referring your patient, Bjorn Sykes, to the [facility]. Please see a copy of my visit note below.    Bjorn Sykes is an extremely pleasant 51 year old year old female patient here today for spot on forehead.   .   Patient states this has been present for a while.  Patient reports the following symptoms:  none.  Patient reports the following previous treatments none.  These treatments did not work.  Patient reports the following modifying factors none.  Associated symptoms: none.  Patient has no other skin complaints today.  Remainder of the HPI, Meds, PMH, Allergies, FH, and SH was reviewed in chart.      Past Medical History:   Diagnosis Date     Adnexal mass 8/2009     Diagnostic skin and sensitization tests 8/98 skin tests per [facility] pos. for M/T only. 9/00 skin tests [facility] pos. for: dog/DM/M only (T not retested)     Dysmenorrhea     IBU Helps     Intermittent asthma      Migraine      Osteopenia     [facility]     Ovarian carcinoma (H) 8/2009    LEFT side   Dr.Jonson Curt Tabor warts 1990's-2001       Past Surgical History:   Procedure Laterality Date     APPENDECTOMY OPEN  8/13/2009    see above     BUNIONECTOMY  10/16/2012    Procedure: BUNIONECTOMY;  Right Theo bunionectomy;  Surgeon: Daniel Arias MD;  [facility]: [facility]     COLONOSCOPY WITH CO2 INSUFFLATION N/A 6/25/2019    Procedure: COLONOSCOPY, WITH CO2 INSUFFLATION;  Surgeon: Toro Dover MD;  [facility]     COMBINED ESOPHAGOSCOPY, GASTROSCOPY, DUODENOSCOPY (EGD) WITH CO2 INSUFFLATION N/A 6/25/2019    Procedure: ESOPHAGOGASTRODUODENOSCOPY, WITH CO2 INSUFFLATION;  Surgeon: Toro Dover MD;  Location: [facility]     D & C  8/2007    cysts in uterus     ESOPHAGOSCOPY, GASTROSCOPY, DUODENOSCOPY (EGD), COMBINED N/A 6/25/2019    Procedure:  Esophagogastroduodenoscopy, With Biopsy;  Surgeon: Toro Dover MD;  Location: [facility]     HYSTERECTOMY TOTAL ABDOMINAL, BILATERAL SALPINGO-OOPHORECTOMY, COMBINED  8/13/2009    appy also     HYSTERECTOMY, PAP NO LONGER INDICATED          Family History   Problem Relation Age of Onset     Alcohol/Drug Father      C.A.D. Paternal Grandfather      Hypertension Paternal Grandfather      Cerebrovascular Disease Paternal Grandfather      Genetic Disorder Sister         Angelman's syndrome     Thyroid Disease Paternal Grandmother      Breast Cancer Other         aunt, maternal     Alzheimer Disease Other         aunt     Thyroid Disease Sister      Macular Degeneration No family hx of      Glaucoma No family hx of        Social History     Socioeconomic History     Marital status:      Spouse name: Not on file     Number of children: Not on file     Years of education: Not on file     Highest education level: Not on file   Occupational History     Occupation: pharmacist   Tobacco Use     Smoking status: Never Smoker     Smokeless tobacco: Never Used   Substance and Sexual Activity     Alcohol use: Yes     Comment: occasional     Drug use: No     Sexual activity: Yes     Partners: Male     Birth control/protection: Surgical   Other Topics Concern     Parent/sibling w/ CABG, MI or angioplasty before 65F 55M? Not Asked   Social History Narrative    March 26, 2018    ENVIRONMENTAL HISTORY: [place] The home is heated with a forced air. They do have central air conditioning. The patient's bedroom is furnished with hard jorge in bedroom, allergen mattress cover and allergen pillowcase cover. No pets inside the house. There is no  history of cockroach or mice infestation. There are no smokers in the house.  The house does have a damp basement, use a dehumidifier in the summer.    1961     Social Determinants of Health     Financial Resource Strain:       Difficulty of Paying Living Expenses:    Food Insecurity:      Worried About Running Out of Food in the Last Year:      Ran Out of Food in the Last Year:    Transportation Needs:      Lack of Transportation (Medical):      Lack of Transportation (Non-Medical):    Physical Activity:      Days of Exercise per Week:      Minutes of Exercise per Session:    Stress:      Feeling of Stress :    Social Connections:      Frequency of Communication with Friends and Family:      Frequency of Social Gatherings with Friends and Family:      Attends Zoroastrian Services:      Active Member of Clubs or Organizations:      Attends Club or Organization Meetings:      Marital Status:    Intimate Partner Violence:      Fear of Current or Ex-Partner:      Emotionally Abused:      Physically Abused:      Sexually Abused:        Outpatient Encounter Medications as of 10/27/2021   Medication Sig Dispense Refill     albuterol (PROAIR HFA/PROVENTIL HFA/VENTOLIN HFA) 108 (90 BASE) MCG/ACT Inhaler Inhale 2 puffs into the lungs every 4 hours as needed for shortness of breath / dyspnea 1 Inhaler 2     ascorbic acid (VITAMIN C) 500 MG tablet Take 500 mg by mouth as needed.       Calcium Carbonate-Vitamin D (CALCIUM 500 + D PO) Take by mouth 2 times daily       cetirizine (ZYRTEC) 10 MG tablet Take 1 tablet (10 mg) by mouth daily 90 tablet 3     Glucosamine-Chondroit-Vit C-Mn (GLUCOSAMINE CHONDR 1500 COMPLX PO) Take  by mouth 2 times daily.       IBUPROFEN PO Take  by mouth as needed.       mometasone (ELOCON) 0.1 % cream Apply topically daily as needed Apply 1 dose topically daily as needed 45 g 3     mometasone (NASONEX) 50 MCG/ACT nasal spray Spray 2 sprays into both nostrils daily 3 Box 3     MULTIVITAMIN OR once daily       olopatadine (PATADAY) 0.2 % ophthalmic solution Place 1 drop into both eyes daily 1 Bottle 3     No facility-administered encounter medications on file as of 10/27/2021.             O:   NAD, WDWN, Alert & Oriented, Mood &  Affect wnl, Vitals stable   Here today alone   LMP 07/05/2009    General appearance normal   Vitals stable   Alert, oriented and in no acute distress     L forehead movable nodule    Stuck on papules and brown macules on trunk and ext   Red papules on trunk  Flesh colored papules on trunk     The remainder of the full exam was normal; the following areas were examined:  conjunctiva/lids, oral mucosa, neck, peripheral vascular system, abdomen, lymph nodes, digits/nails, eccrine and apocrine glands, scalp/hair, face, neck, chest, abdomen, buttocks, back, RUE, LUE, RLE, LLE       Eyes: Conjunctivae/lids:Normal     ENT: Lips, buccal mucosa, tongue: normal    MSK:Normal    Cardiovascular: peripheral edema none    Pulm: Breathing Normal    Lymph Nodes: No Head and Neck Lymphadenopathy     Neuro/Psych: Orientation:Alert and Orientedx3 ; Mood/Affect:normal       A/P:  1. Seborrheic keratosis, lentigo, angioma, dermal nevus, lipoma forehead  It was a pleasure speaking to Bjorn Sykes today.  Previous clinic notes and pertinent laboratory tests were reviewed prior to Bjorn Sykes's visit.  Nature and genetics of benign skin lesions dicussed with patient.  Signs and Symptoms of skin cancer discussed with patient.  Patient encouraged to perform monthly skin exams.  UV precautions reviewed with patient.  Return to clinic 12 months        Again, thank you for allowing me to participate in the care of your patient.        Sincerely,        Drew Oates MD

## 2021-10-27 NOTE — PROGRESS NOTES
Bjorn Sykes is an extremely pleasant 51 year old year old female patient here today for spot on forehead.   .   Patient states this has been present for a while.  Patient reports the following symptoms:  none.  Patient reports the following previous treatments none.  These treatments did not work.  Patient reports the following modifying factors none.  Associated symptoms: none.  Patient has no other skin complaints today.  Remainder of the HPI, Meds, PMH, Allergies, FH, and SH was reviewed in chart.      Past Medical History:   Diagnosis Date     Adnexal mass 8/2009     Diagnostic skin and sensitization tests 8/98 skin tests per [facility] pos. for M/T only. 9/00 skin tests [facility] pos. for: dog/DM/M only (T not retested)     Dysmenorrhea     IBU Helps     Intermittent asthma      Migraine      Osteopenia     [facility]     Ovarian carcinoma (H) 8/2009    LEFT side   Dr.Jonson Curt Tabor warts 1990's-2001       Past Surgical History:   Procedure Laterality Date     APPENDECTOMY OPEN  8/13/2009    see above     BUNIONECTOMY  10/16/2012    Procedure: BUNIONECTOMY;  Right Theo bunionectomy;  Surgeon: Daniel Arias MD;  Location: [facility]     COLONOSCOPY WITH CO2 INSUFFLATION N/A 6/25/2019    Procedure: COLONOSCOPY, WITH CO2 INSUFFLATION;  Surgeon: Toro Dover MD;  Location: [facility]     COMBINED ESOPHAGOSCOPY, GASTROSCOPY, DUODENOSCOPY (EGD) WITH CO2 INSUFFLATION N/A 6/25/2019    Procedure: ESOPHAGOGASTRODUODENOSCOPY, WITH CO2 INSUFFLATION;  Surgeon: Toro Dover MD;  Location: [facility]     D & C  8/2007    cysts in uterus     ESOPHAGOSCOPY, GASTROSCOPY, DUODENOSCOPY (EGD), COMBINED N/A 6/25/2019    Procedure: Esophagogastroduodenoscopy, With Biopsy;  Surgeon: Toro Dover MD;  Location: [facility]     HYSTERECTOMY TOTAL ABDOMINAL, BILATERAL SALPINGO-OOPHORECTOMY, COMBINED  8/13/2009    appy also     HYSTERECTOMY, PAP NO LONGER INDICATED          Family History   Problem Relation Age of  Onset     Alcohol/Drug Father      C.A.D. Paternal Grandfather      Hypertension Paternal Grandfather      Cerebrovascular Disease Paternal Grandfather      Genetic Disorder Sister         Angelman's syndrome     Thyroid Disease Paternal Grandmother      Breast Cancer Other         aunt, maternal     Alzheimer Disease Other         aunt     Thyroid Disease Sister      Macular Degeneration No family hx of      Glaucoma No family hx of        Social History     Socioeconomic History     Marital status:      Spouse name: Not on file     Number of children: Not on file     Years of education: Not on file     Highest education level: Not on file   Occupational History     Occupation: pharmacist   Tobacco Use     Smoking status: Never Smoker     Smokeless tobacco: Never Used   Substance and Sexual Activity     Alcohol use: Yes     Comment: occasional     Drug use: No     Sexual activity: Yes     Partners: Male     Birth control/protection: Surgical   Other Topics Concern     Parent/sibling w/ CABG, MI or angioplasty before 65F 55M? Not Asked   Social History Narrative    March 26, 2018    ENVIRONMENTAL HISTORY: [place] The home is heated with a forced air. They do have central air conditioning. The patient's bedroom is furnished with hard jorge in bedroom, allergen mattress cover and allergen pillowcase cover. No pets inside the house. There is no  history of cockroach or mice infestation. There are no smokers in the house.  The house does have a damp basement, use a dehumidifier in the summer.    1961     Social Determinants of Health     Financial Resource Strain:      Difficulty of Paying Living Expenses:    Food Insecurity:      Worried About Running Out of Food in the Last Year:      Ran Out of Food in the Last Year:    Transportation Needs:      Lack of Transportation (Medical):      Lack of Transportation (Non-Medical):    Physical Activity:      Days of  Exercise per Week:      Minutes of Exercise per Session:    Stress:      Feeling of Stress :    Social Connections:      Frequency of Communication with Friends and Family:      Frequency of Social Gatherings with Friends and Family:      Attends [employer] Services:      Active Member of Clubs or Organizations:      Attends Club or Organization Meetings:      Marital Status:    Intimate Partner Violence:      Fear of Current or Ex-Partner:      Emotionally Abused:      Physically Abused:      Sexually Abused:        Outpatient Encounter Medications as of 10/27/2021   Medication Sig Dispense Refill     albuterol (PROAIR HFA/PROVENTIL HFA/VENTOLIN HFA) 108 (90 BASE) MCG/ACT Inhaler Inhale 2 puffs into the lungs every 4 hours as needed for shortness of breath / dyspnea 1 Inhaler 2     ascorbic acid (VITAMIN C) 500 MG tablet Take 500 mg by mouth as needed.       Calcium Carbonate-Vitamin D (CALCIUM 500 + D PO) Take by mouth 2 times daily       cetirizine (ZYRTEC) 10 MG tablet Take 1 tablet (10 mg) by mouth daily 90 tablet 3     Glucosamine-Chondroit-Vit C-Mn (GLUCOSAMINE CHONDR 1500 COMPLX PO) Take  by mouth 2 times daily.       IBUPROFEN PO Take  by mouth as needed.       mometasone (ELOCON) 0.1 % cream Apply topically daily as needed Apply 1 dose topically daily as needed 45 g 3     mometasone (NASONEX) 50 MCG/ACT nasal spray Spray 2 sprays into both nostrils daily 3 Box 3     MULTIVITAMIN OR once daily       olopatadine (PATADAY) 0.2 % ophthalmic solution Place 1 drop into both eyes daily 1 Bottle 3     No facility-administered encounter medications on file as of 10/27/2021.             O:   NAD, WDWN, Alert & Oriented, Mood & Affect wnl, Vitals stable   Here today alone   LMP 07/05/2009    General appearance normal   Vitals stable   Alert, oriented and in no acute distress     L forehead movable nodule    Stuck on papules and brown macules on trunk and ext   Red papules on trunk  Flesh colored papules on  trunk     The remainder of the full exam was normal; the following areas were examined:  conjunctiva/lids, oral mucosa, neck, peripheral vascular system, abdomen, lymph nodes, digits/nails, eccrine and apocrine glands, scalp/hair, face, neck, chest, abdomen, buttocks, back, RUE, LUE, RLE, LLE       Eyes: Conjunctivae/lids:Normal     ENT: Lips, buccal mucosa, tongue: normal    MSK:Normal    Cardiovascular: peripheral edema none    Pulm: Breathing Normal    Lymph Nodes: No Head and Neck Lymphadenopathy     Neuro/Psych: Orientation:Alert and Orientedx3 ; Mood/Affect:normal       A/P:  1. Seborrheic keratosis, lentigo, angioma, dermal nevus, lipoma forehead  It was a pleasure speaking to Bjorn Sykes today.  Previous clinic notes and pertinent laboratory tests were reviewed prior to Bjorn Sykes's visit.  Nature and genetics of benign skin lesions dicussed with patient.  Signs and Symptoms of skin cancer discussed with patient.  Patient encouraged to perform monthly skin exams.  UV precautions reviewed with patient.  Return to clinic 12 months

## 2021-10-27 NOTE — NURSING NOTE
"Chief Complaint   Patient presents with     Skin Check       Vitals:    10/27/21 1354   Pulse: 72   SpO2: 99%   Height: 1.632 m (5' 4.25\")     Wt Readings from Last 1 Encounters:   01/12/21 71.7 kg (158 lb)       Raisa Herman LPN.................10/27/2021    "

## 2022-01-25 ENCOUNTER — ANCILLARY PROCEDURE (OUTPATIENT)
Dept: MAMMOGRAPHY | Facility: CLINIC | Age: 52
End: 2022-01-25
Attending: OBSTETRICS & GYNECOLOGY
Payer: COMMERCIAL

## 2022-01-25 ENCOUNTER — OFFICE VISIT (OUTPATIENT)
Dept: OBGYN | Facility: CLINIC | Age: 52
End: 2022-01-25
Payer: COMMERCIAL

## 2022-01-25 VITALS
BODY MASS INDEX: 26.26 KG/M2 | HEART RATE: 68 BPM | OXYGEN SATURATION: 99 % | WEIGHT: 157.6 LBS | SYSTOLIC BLOOD PRESSURE: 131 MMHG | DIASTOLIC BLOOD PRESSURE: 83 MMHG | HEIGHT: 65 IN

## 2022-01-25 DIAGNOSIS — Z13.1 SCREENING FOR DIABETES MELLITUS: ICD-10-CM

## 2022-01-25 DIAGNOSIS — Z01.419 ENCOUNTER FOR GYNECOLOGICAL EXAMINATION WITHOUT ABNORMAL FINDING: Primary | ICD-10-CM

## 2022-01-25 DIAGNOSIS — Z12.31 VISIT FOR SCREENING MAMMOGRAM: ICD-10-CM

## 2022-01-25 DIAGNOSIS — Z13.220 SCREENING CHOLESTEROL LEVEL: ICD-10-CM

## 2022-01-25 DIAGNOSIS — J45.20 ASTHMA, INTERMITTENT, UNCOMPLICATED: ICD-10-CM

## 2022-01-25 LAB
CHOLEST SERPL-MCNC: 232 MG/DL
FASTING STATUS PATIENT QL REPORTED: NO
FASTING STATUS PATIENT QL REPORTED: YES
GLUCOSE BLD-MCNC: 99 MG/DL (ref 70–99)
HDLC SERPL-MCNC: 80 MG/DL
LDLC SERPL CALC-MCNC: 130 MG/DL
NONHDLC SERPL-MCNC: 152 MG/DL
TRIGL SERPL-MCNC: 111 MG/DL

## 2022-01-25 PROCEDURE — 77067 SCR MAMMO BI INCL CAD: CPT | Mod: TC | Performed by: RADIOLOGY

## 2022-01-25 PROCEDURE — 77063 BREAST TOMOSYNTHESIS BI: CPT | Mod: TC | Performed by: RADIOLOGY

## 2022-01-25 PROCEDURE — 80061 LIPID PANEL: CPT | Performed by: OBSTETRICS & GYNECOLOGY

## 2022-01-25 PROCEDURE — 82947 ASSAY GLUCOSE BLOOD QUANT: CPT | Performed by: OBSTETRICS & GYNECOLOGY

## 2022-01-25 PROCEDURE — 99396 PREV VISIT EST AGE 40-64: CPT | Performed by: OBSTETRICS & GYNECOLOGY

## 2022-01-25 PROCEDURE — 36415 COLL VENOUS BLD VENIPUNCTURE: CPT | Performed by: OBSTETRICS & GYNECOLOGY

## 2022-01-25 RX ORDER — MOMETASONE FUROATE MONOHYDRATE 50 UG/1
2 SPRAY, METERED NASAL DAILY
Qty: 17 G | Refills: 3 | Status: SHIPPED | OUTPATIENT
Start: 2022-01-25 | End: 2022-08-10

## 2022-01-25 ASSESSMENT — MIFFLIN-ST. JEOR: SCORE: 1325.75

## 2022-01-25 NOTE — PROGRESS NOTES
Bjorn Sykes is a 51 year old female , who presents for annual exam.   No unusual bleeding, no incontinence, or unusual discharge.     Last cholesterol: Recent Labs   Lab Test 16  0818   CHOL 222*   HDL 76   *   TRIG 63     Past Medical History:   Diagnosis Date     Adnexal mass 2009     Diagnostic skin and sensitization tests  skin tests per [facility] pos. for M/T only.  skin tests [facility] pos. for: dog/DM/M only (T not retested)     Dysmenorrhea     IBU Helps     Intermittent asthma      Migraine      Osteopenia     [facility]     Ovarian carcinoma (H) 2009    LEFT side   Dr.Jonson Curt PANIAGUA     Plantar warts s-       Past Surgical History:   Procedure Laterality Date     APPENDECTOMY OPEN  2009    see above     BUNIONECTOMY  10/16/2012    Procedure: BUNIONECTOMY;  Right Theo bunionectomy;  Surgeon: Daniel Arias MD;  Location: [facility]     COLONOSCOPY WITH CO2 INSUFFLATION N/A 2019    Procedure: COLONOSCOPY, WITH CO2 INSUFFLATION;  Surgeon: Toro Dover MD;  Location: [facility]     COMBINED ESOPHAGOSCOPY, GASTROSCOPY, DUODENOSCOPY (EGD) WITH CO2 INSUFFLATION N/A 2019    Procedure: ESOPHAGOGASTRODUODENOSCOPY, WITH CO2 INSUFFLATION;  Surgeon: Toro Dover MD;  Location: [facility]     D & C  2007    cysts in uterus     ESOPHAGOSCOPY, GASTROSCOPY, DUODENOSCOPY (EGD), COMBINED N/A 2019    Procedure: Esophagogastroduodenoscopy, With Biopsy;  Surgeon: Toro Dover MD;  Location: [facility]     HYSTERECTOMY TOTAL ABDOMINAL, BILATERAL SALPINGO-OOPHORECTOMY, COMBINED  2009    appy also     HYSTERECTOMY, PAP NO LONGER INDICATED         OB History    Para Term  AB Living   0 0 0 0 0 0   SAB IAB Ectopic Multiple Live Births   0 0 0 0 0       Gyn History:  Gynecological History         Patient's last menstrual period was 2009.     No STD/No PID/No IUD      history of abnormal pap smear:  No  Last pap:   Lab Results    Component Value Date    PAP NIL 01/12/2016           Current Outpatient Medications   Medication Sig Dispense Refill     albuterol (PROAIR HFA/PROVENTIL HFA/VENTOLIN HFA) 108 (90 BASE) MCG/ACT Inhaler Inhale 2 puffs into the lungs every 4 hours as needed for shortness of breath / dyspnea 1 Inhaler 2     ascorbic acid (VITAMIN C) 500 MG tablet Take 500 mg by mouth as needed.       Calcium Carbonate-Vitamin D (CALCIUM 500 + D PO) Take by mouth 2 times daily       cetirizine (ZYRTEC) 10 MG tablet Take 1 tablet (10 mg) by mouth daily 90 tablet 3     Glucosamine-Chondroit-Vit C-Mn (GLUCOSAMINE CHONDR 1500 COMPLX PO) Take  by mouth 2 times daily.       IBUPROFEN PO Take  by mouth as needed.       mometasone (ELOCON) 0.1 % cream Apply topically daily as needed Apply 1 dose topically daily as needed 45 g 3     mometasone (NASONEX) 50 MCG/ACT nasal spray Spray 2 sprays into both nostrils daily 3 Box 3     MULTIVITAMIN OR once daily       olopatadine (PATADAY) 0.2 % ophthalmic solution Place 1 drop into both eyes daily 1 Bottle 3       Allergies   Allergen Reactions     Flagyl [Metronidazole] Rash     Sulfa Drugs Rash       Social History     Socioeconomic History     Marital status:      Spouse name: Not on file     Number of children: Not on file     Years of education: Not on file     Highest education level: Not on file   Occupational History     Occupation: pharmacist   Tobacco Use     Smoking status: Never Smoker     Smokeless tobacco: Never Used   Substance and Sexual Activity     Alcohol use: Yes     Comment: occasional     Drug use: No     Sexual activity: Yes     Partners: Male     Birth control/protection: Surgical   Other Topics Concern     Parent/sibling w/ CABG, MI or angioplasty before 65F 55M? Not Asked   Social History Narrative    March 26, 2018    ENVIRONMENTAL HISTORY: [place] The home is heated with a forced air. They do have central air  "conditioning. The patient's bedroom is furnished with hard jorge in bedroom, allergen mattress cover and allergen pillowcase cover. No pets inside the house. There is no  history of cockroach or mice infestation. There are no smokers in the house.  The house does have a damp basement, use a dehumidifier in the summer.    1961     Social Determinants of Health     Financial Resource Strain: Not on file   Food Insecurity: Not on file   Transportation Needs: Not on file   Physical Activity: Not on file   Stress: Not on file   Social Connections: Not on file   Intimate Partner Violence: Not on file   Housing Stability: Not on file       Family History   Problem Relation Age of Onset     Alcohol/Drug Father      C.A.D. Paternal Grandfather      Hypertension Paternal Grandfather      Cerebrovascular Disease Paternal Grandfather      Genetic Disorder Sister         Angelman's syndrome     Thyroid Disease Paternal Grandmother      Breast Cancer Other         aunt, maternal     Alzheimer Disease Other         aunt     Thyroid Disease Sister      Macular Degeneration No family hx of      Glaucoma No family hx of          ROS:  All negative except as above.      EXAM:  /83 (BP Location: Right arm, Cuff Size: Adult Regular)   Pulse 68   Ht 1.651 m (5' 5\")   Wt 71.5 kg (157 lb 9.6 oz)   LMP 07/05/2009   SpO2 99%   BMI 26.23 kg/m    General:  WNWD female, NAD  Alert  Oriented x 3  Gait:  Normal  Skin:  Normal skin turgor  Neurologic:  CN grossly intact, good sensation.    HEENT:  NC/AT, EOMI  Neck:  No masses palpated, symmetrical, carotids +2/4, no bruits heard  Heart:  RRR  Lungs:  Clear   Breasts:  Symmetrical, no dimpling noted, no masses palpated, no discharge expressed  Abdomen:  Non-tender, non-distended.  Vulva: No external lesions, normal hair distribution, no adenopathy  BUS:  Normal, no masses noted  Urethra:  No hypermobility noted  Urethral meatus:  No masses noted  Vagina: Moist, pink, no abnormal " discharge, well rugated, no lesions  Cervix: absent   Uterus: absent   Ovaries: absent   Bimanual:  No masses palpated.   Perianal:  No masses noted.   Rectal exam: declined   Extremities:  No clubbing, cyanosis, or edema      ASSESSMENT/PLAN   Annual examination   Refill for Nasonex  Fasting labs ordered.   Low fat diet, weight bearing exercises and self breast exams on a monthly basis have been recommended.  I have discussed with patient the risks, benefits, medications, treatment options and modalities.   I have instructed the patient to call or schedule a follow-up appointment if any problems.

## 2022-08-10 DIAGNOSIS — J45.20 ASTHMA, INTERMITTENT, UNCOMPLICATED: ICD-10-CM

## 2022-08-10 RX ORDER — MOMETASONE FUROATE MONOHYDRATE 50 UG/1
2 SPRAY, METERED NASAL DAILY
Qty: 17 G | Refills: 3 | Status: SHIPPED | OUTPATIENT
Start: 2022-08-10

## 2022-08-10 NOTE — TELEPHONE ENCOUNTER
"Requested Prescriptions   Pending Prescriptions Disp Refills     mometasone (NASONEX) 50 MCG/ACT nasal spray 17 g 3     Sig: Spray 2 sprays into both nostrils daily       Nasal Allergy Protocol Passed - 8/10/2022  7:43 AM        Passed - Patient is age 12 or older        Passed - Recent (12 mo) or future (30 days) visit within the authorizing provider's specialty     Patient has had an office visit with the authorizing provider or a provider within the authorizing providers department within the previous 12 mos or has a future within next 30 days. See \"Patient Info\" tab in inbasket, or \"Choose Columns\" in Meds & Orders section of the refill encounter.              Passed - Medication is active on med list           Prescription approved per [facility] Refill Protocol.    Malaika Braden RN    "

## 2023-02-09 NOTE — TELEPHONE ENCOUNTER
Routing refill request to provider for review/approval because:  These were prescribed by provider no longer at clinic, routing to pcp to advise     Sandra Rodriguez RN            none

## 2023-03-01 ENCOUNTER — OFFICE VISIT (OUTPATIENT)
Dept: OBGYN | Facility: CLINIC | Age: 53
End: 2023-03-01
Payer: COMMERCIAL

## 2023-03-01 ENCOUNTER — ANCILLARY PROCEDURE (OUTPATIENT)
Dept: MAMMOGRAPHY | Facility: CLINIC | Age: 53
End: 2023-03-01
Attending: OBSTETRICS & GYNECOLOGY
Payer: COMMERCIAL

## 2023-03-01 ENCOUNTER — ANCILLARY ORDERS (OUTPATIENT)
Dept: MAMMOGRAPHY | Facility: CLINIC | Age: 53
End: 2023-03-01

## 2023-03-01 VITALS
DIASTOLIC BLOOD PRESSURE: 85 MMHG | BODY MASS INDEX: 27.55 KG/M2 | HEIGHT: 64 IN | WEIGHT: 161.4 LBS | SYSTOLIC BLOOD PRESSURE: 131 MMHG | HEART RATE: 72 BPM | OXYGEN SATURATION: 100 %

## 2023-03-01 DIAGNOSIS — Z12.31 VISIT FOR SCREENING MAMMOGRAM: ICD-10-CM

## 2023-03-01 DIAGNOSIS — Z01.419 ENCOUNTER FOR GYNECOLOGICAL EXAMINATION WITHOUT ABNORMAL FINDING: Primary | ICD-10-CM

## 2023-03-01 PROCEDURE — 77067 SCR MAMMO BI INCL CAD: CPT | Mod: TC | Performed by: RADIOLOGY

## 2023-03-01 PROCEDURE — 99396 PREV VISIT EST AGE 40-64: CPT | Performed by: OBSTETRICS & GYNECOLOGY

## 2023-03-01 PROCEDURE — 77063 BREAST TOMOSYNTHESIS BI: CPT | Mod: TC | Performed by: RADIOLOGY

## 2023-03-01 NOTE — PROGRESS NOTES
Bjorn Sykes is a 52 year old female , who presents for annual exam.   No unusual bleeding, no incontinence, or unusual discharge.     Last cholesterol: Recent Labs   Lab Test 22  0829 16  0818   CHOL 232* 222*   HDL 80 76   * 133*   TRIG 111 63     Past Medical History:   Diagnosis Date     Adnexal mass 2009     Diagnostic skin and sensitization tests  skin tests per [facility] pos. for M/T only.  skin tests [facility] pos. for: dog/DM/M only (T not retested)     Dysmenorrhea     IBU Helps     Intermittent asthma      Migraine      Osteopenia     [facility]     Ovarian carcinoma (H) 2009    LEFT side   Dr.Jonson Curt cardenas s-       Past Surgical History:   Procedure Laterality Date     APPENDECTOMY OPEN  2009    see above     BUNIONECTOMY  10/16/2012    Procedure: BUNIONECTOMY;  Right Theo bunionectomy;  Surgeon: Daniel Arias MD;  Location: [facility]     COLONOSCOPY WITH CO2 INSUFFLATION N/A 2019    Procedure: COLONOSCOPY, WITH CO2 INSUFFLATION;  Surgeon: Toor Dover MD;  Location: [facility]     COMBINED ESOPHAGOSCOPY, GASTROSCOPY, DUODENOSCOPY (EGD) WITH CO2 INSUFFLATION N/A 2019    Procedure: ESOPHAGOGASTRODUODENOSCOPY, WITH CO2 INSUFFLATION;  Surgeon: Toro Dover MD;  Location: [facility]     D & C  2007    cysts in uterus     ESOPHAGOSCOPY, GASTROSCOPY, DUODENOSCOPY (EGD), COMBINED N/A 2019    Procedure: Esophagogastroduodenoscopy, With Biopsy;  Surgeon: Toro Dover MD;  Location: [facility]     HYSTERECTOMY TOTAL ABDOMINAL, BILATERAL SALPINGO-OOPHORECTOMY, COMBINED  2009    appy also     HYSTERECTOMY, PAP NO LONGER INDICATED         OB History    Para Term  AB Living   0 0 0 0 0 0   SAB IAB Ectopic Multiple Live Births   0 0 0 0 0       Gyn History:  Gynecological History         Patient's last menstrual period was 2009.           history of abnormal pap smear:   Last pap:   Lab Results    Component Value Date    PAP NIL 01/12/2016           Current Outpatient Medications   Medication Sig Dispense Refill     albuterol (PROAIR HFA/PROVENTIL HFA/VENTOLIN HFA) 108 (90 BASE) MCG/ACT Inhaler Inhale 2 puffs into the lungs every 4 hours as needed for shortness of breath / dyspnea 1 Inhaler 2     ascorbic acid (VITAMIN C) 500 MG tablet Take 500 mg by mouth as needed.       Calcium Carbonate-Vitamin D (CALCIUM 500 + D PO) Take by mouth 2 times daily       cetirizine (ZYRTEC) 10 MG tablet Take 1 tablet (10 mg) by mouth daily 90 tablet 3     Glucosamine-Chondroit-Vit C-Mn (GLUCOSAMINE CHONDR 1500 COMPLX PO) Take  by mouth 2 times daily.       IBUPROFEN PO Take  by mouth as needed.       mometasone (ELOCON) 0.1 % cream Apply topically daily as needed Apply 1 dose topically daily as needed 45 g 3     mometasone (NASONEX) 50 MCG/ACT nasal spray Spray 2 sprays into both nostrils daily 17 g 3     MULTIVITAMIN OR once daily       olopatadine (PATADAY) 0.2 % ophthalmic solution Place 1 drop into both eyes daily 1 Bottle 3       Allergies   Allergen Reactions     Flagyl [Metronidazole] Rash     Sulfa Drugs Rash       Social History     Socioeconomic History     Marital status:      Spouse name: Not on file     Number of children: Not on file     Years of education: Not on file     Highest education level: Not on file   Occupational History     Occupation: pharmacist   Tobacco Use     Smoking status: Never     Smokeless tobacco: Never   Substance and Sexual Activity     Alcohol use: Yes     Comment: occasional     Drug use: No     Sexual activity: Yes     Partners: Male     Birth control/protection: Surgical   Other Topics Concern     Parent/sibling w/ CABG, MI or angioplasty before 65F 55M? Not Asked   Social History Narrative    March 26, 2018    ENVIRONMENTAL HISTORY: [place] The home is heated with a forced air. They do have central air conditioning. The  "patient's bedroom is furnished with hard jorge in bedroom, allergen mattress cover and allergen pillowcase cover. No pets inside the house. There is no  history of cockroach or mice infestation. There are no smokers in the house.  The house does have a damp basement, use a dehumidifier in the summer.    1961     Social Determinants of Health     Financial Resource Strain: Not on file   Food Insecurity: Not on file   Transportation Needs: Not on file   Physical Activity: Not on file   Stress: Not on file   Social Connections: Not on file   Intimate Partner Violence: Not on file   Housing Stability: Not on file       Family History   Problem Relation Age of Onset     Alcohol/Drug Father      C.A.D. Paternal Grandfather      Hypertension Paternal Grandfather      Cerebrovascular Disease Paternal Grandfather      Genetic Disorder Sister         Angelman's syndrome     Thyroid Disease Paternal Grandmother      Breast Cancer Other         aunt, maternal     Alzheimer Disease Other         aunt     Thyroid Disease Sister      Macular Degeneration No family hx of      Glaucoma No family hx of          ROS:  All negative except as above.      EXAM:  /85 (BP Location: Right arm, Cuff Size: Adult Regular)   Pulse 72   Ht 1.632 m (5' 4.25\")   Wt 73.2 kg (161 lb 6.4 oz)   LMP 07/05/2009   SpO2 100%   BMI 27.49 kg/m    General:  WNWD female, NAD  Alert  Oriented x 3  Gait:  Normal  Skin:  Normal skin turgor  Neurologic:  CN grossly intact, good sensation.    HEENT:  NC/AT, EOMI  Neck:  No masses palpated, symmetrical, carotids +2/4, no bruits heard  Heart:  RRR  Lungs:  Clear   Breasts:  Symmetrical, no dimpling noted, no masses palpated, no discharge expressed  Abdomen:  Non-tender, non-distended.  Vulva: No external lesions, normal hair distribution, no adenopathy  BUS:  Normal, no masses noted  Urethra:  No hypermobility noted  Urethral meatus:  No masses noted  Vagina: Atrophic, no lesions  Cervix: absent "   Uterus: absent   Ovaries: absent   Bimanual:  No masses palpated.   Perianal:  No masses noted.   Rectal exam: declined   Extremities:  No clubbing, cyanosis, or edema      ASSESSMENT/PLAN   Annual examination   Dexa scan was performed in 2016 and recommendations to repeat in 2026.   Low fat diet, weight bearing exercises and self breast exams on a monthly basis have been recommended.  I have discussed with patient the risks, benefits, medications, treatment options and modalities.   I have instructed the patient to call or schedule a follow-up appointment if any problems.

## 2023-03-13 ENCOUNTER — E-VISIT (OUTPATIENT)
Dept: URGENT CARE | Facility: CLINIC | Age: 53
End: 2023-03-13
Payer: COMMERCIAL

## 2023-03-13 DIAGNOSIS — J01.90 ACUTE SINUSITIS WITH SYMPTOMS > 10 DAYS: Primary | ICD-10-CM

## 2023-03-13 PROCEDURE — 99421 OL DIG E/M SVC 5-10 MIN: CPT | Performed by: NURSE PRACTITIONER

## 2023-03-13 NOTE — PATIENT INSTRUCTIONS
Sinusitis (Antibiotic Treatment)    The sinuses are air-filled spaces within the bones of the face. They connect to the inside of the nose. Sinusitis is an inflammation of the tissue that lines the sinuses. Sinusitis can occur during a cold. It can also happen due to allergies to pollens and other particles in the air. Sinusitis can cause symptoms of sinus congestion and a feeling of fullness. A sinus infection causes fever, headache, and facial pain. There is often green or yellow fluid draining from the nose or into the back of the throat (post-nasal drip). You have been given antibiotics to treat this condition.   Home care    Take the full course of antibiotics as instructed. Don't stop taking them, even when you feel better.    Drink plenty of water, hot tea, and other liquids as directed by the healthcare provider. This may help thin nasal mucus. It also may help your sinuses drain fluids.    Heat may help soothe painful areas of your face. Use a towel soaked in hot water. Or,  the shower and direct the warm spray onto your face. Using a vaporizer along with a menthol rub at night may also help soothe symptoms.     An expectorant with guaifenesin may help thin nasal mucus and help your sinuses drain fluids. Talk with your provider or pharmacists before taking an over-the-counter (OTC) medicine if you have any questions about it or its side effects..    You can use an OTC decongestant, unless a similar medicine was prescribed to you. Nasal sprays work the fastest. Use one that contains phenylephrine or oxymetazoline. First blow your nose gently. Then use the spray. Don't use these medicines more often than directed on the label. If you do, your symptoms may get worse. You may also take pills that contain pseudoephedrine. Don t use products that combine multiple medicines. This is because side effects may be increased. Read labels. You can also ask the pharmacist for help. (People with high blood  pressure should not use decongestants. They can raise blood pressure.) Talk with your provider or pharmacist if you have any questions about the medicine..    OTC antihistamines may help if allergies contributed to your sinusitis. Talk with your provider or pharmacist if you have any questions about the medicine..    Don't use nasal rinses or irrigation during an acute sinus infection, unless your healthcare provider tells you to. Rinsing may spread the infection to other areas in your sinuses.    Use acetaminophen or ibuprofen to control pain, unless another pain medicine was prescribed to you. If you have chronic liver or kidney disease or ever had a stomach ulcer, talk with your healthcare provider before using these medicines. Never give aspirin to anyone under age 18 who is ill with a fever. It may cause severe liver damage.    Don't smoke. This can make symptoms worse.    Follow-up care  Follow up with your healthcare provider, or as advised.   When to seek medical advice  Call your healthcare provider if any of these occur:     Facial pain or headache that gets worse    Stiff neck    Unusual drowsiness or confusion    Swelling of your forehead or eyelids    Symptoms don't go away in 10 days    Vision problems, such as blurred or double vision    Fever of 100.4 F (38 C) or higher, or as directed by your healthcare provider  Call 911  Call 911 if any of these occur:     Seizure    Trouble breathing    Feeling dizzy or faint    Fingernails, skin or lips look blue, purple , or gray  Prevention  Here are steps you can take to help prevent an infection:     Keep good hand washing habits.    Don t have close contact with people who have sore throats, colds, or other upper respiratory infections.    Don t smoke, and stay away from secondhand smoke.    Stay up to date with of your vaccines.  [employer] last reviewed this educational content on 12/1/2019 2000-2021 [employer]. All rights reserved. This  information is not intended as a substitute for professional medical care. Always follow your healthcare professional's instructions.        Dear Bjorn Sykes    After reviewing your responses, I've been able to diagnose you with Acute sinusitis with symptoms > 10 days.      Based on your responses and diagnosis, I have prescribed   Orders Placed This Encounter     amoxicillin-clavulanate (AUGMENTIN) 875-125 MG tablet    to treat your symptoms. I have sent this to your pharmacy.?     It is also important to stay well hydrated, get lots of rest and take over-the-counter decongestants,?tylenol?or ibuprofen if you?are able to?take those medications per your primary care provider to help relieve discomfort.?     It is important that you take?all of?your prescribed medication even if your symptoms are improving after a few doses.? Taking?all of?your medicine helps prevent the symptoms from returning.?     If your symptoms worsen, you develop severe headache, vomiting, high fever (>102), or are not improving in 7 days, please contact your primary care provider for an appointment or visit any of our convenient Walk-in Care or Urgent Care Centers to be seen which can be found on our website?here.?     Thanks again for choosing?us?as your health care partner,?   ?  DELICIA Arora CNP?

## 2023-08-21 ENCOUNTER — OFFICE VISIT (OUTPATIENT)
Dept: OPHTHALMOLOGY | Facility: CLINIC | Age: 53
End: 2023-08-21
Payer: COMMERCIAL

## 2023-08-21 DIAGNOSIS — H00.11 CHALAZION OF RIGHT UPPER EYELID: Primary | ICD-10-CM

## 2023-08-21 PROCEDURE — 92012 INTRM OPH EXAM EST PATIENT: CPT | Performed by: OPHTHALMOLOGY

## 2023-08-21 RX ORDER — CEPHALEXIN 500 MG/1
500 CAPSULE ORAL 3 TIMES DAILY
Qty: 21 CAPSULE | Refills: 1 | Status: SHIPPED | OUTPATIENT
Start: 2023-08-21 | End: 2024-05-29

## 2023-08-21 ASSESSMENT — VISUAL ACUITY
CORRECTION_TYPE: GLASSES
OS_SC: 20/25
OS_SC+: -2
OD_SC: 20/30
OD_SC+: -1
METHOD: SNELLEN - LINEAR

## 2023-08-21 ASSESSMENT — SLIT LAMP EXAM - LIDS: COMMENTS: 1+ DERMATOCHALASIS - UPPER LID

## 2023-08-21 ASSESSMENT — EXTERNAL EXAM - RIGHT EYE: OD_EXAM: NORMAL

## 2023-08-21 ASSESSMENT — CUP TO DISC RATIO
OS_RATIO: 0.3
OD_RATIO: 0.2

## 2023-08-21 ASSESSMENT — TONOMETRY
IOP_METHOD: ICARE
OD_IOP_MMHG: 14
OS_IOP_MMHG: 15

## 2023-08-21 ASSESSMENT — EXTERNAL EXAM - LEFT EYE: OS_EXAM: NORMAL

## 2023-08-21 NOTE — PROGRESS NOTES
Current Eye Medications:  Pataday as needed both eyes, tired using this morning. Also put on Stye ointment this morning right eye.     Subjective:  Last night right upper eyelid started swelling and even worse today. Aching pain on right lid. Patient vision is OK both eyes, except for swelling of lid blocking vision a little right eye.      Objective:  See Ophthalmology Exam.       Assessment:  Acute pain and swelling right upper lid; early chalazion?      Plan:  Begin Keflex 500mg three times daily for one week.  Use a hot pack on the lids for 10 minutes a few times a day.     Return visit in 3-4 days if problems persist    Jamar Bang M.D.  [phone]

## 2023-08-21 NOTE — PATIENT INSTRUCTIONS
Begin Keflex 500mg three times daily for one week.  Use a hot pack on the lids for 10 minutes a few times a day.     Return visit in 3-4 days if problems persist    Jamar Bang M.D.  [phone]

## 2023-08-21 NOTE — LETTER
8/21/2023         RE: Bjorn Sykes  [address] [place] [address]        Dear Colleague,    Thank you for referring your patient, Bjorn Sykes, to the [facility]. Please see a copy of my visit note below.     Current Eye Medications:  Pataday as needed both eyes, tired using this morning. Also put on Stye ointment this morning right eye.     Subjective:  Last night right upper eyelid started swelling and even worse today. Aching pain on right lid. Patient vision is OK both eyes, except for swelling of lid blocking vision a little right eye.      Objective:  See Ophthalmology Exam.       Assessment:  Acute pain and swelling right upper lid; early chalazion?      Plan:  Begin Keflex 500mg three times daily for one week.  Use a hot pack on the lids for 10 minutes a few times a day.     Return visit in 3-4 days if problems persist    Jamar Bang M.D.  [phone]       Again, thank you for allowing me to participate in the care of your patient.        Sincerely,        Jamar Bang MD

## 2023-08-24 ENCOUNTER — MYC MEDICAL ADVICE (OUTPATIENT)
Dept: OPHTHALMOLOGY | Facility: CLINIC | Age: 53
End: 2023-08-24
Payer: COMMERCIAL

## 2023-08-24 NOTE — TELEPHONE ENCOUNTER
NI with Keflex and ANDRES. Will work into [facility] schedule tomorrow. Aware of wait time due to staff shortage.

## 2023-08-27 PROBLEM — H00.11 CHALAZION OF RIGHT UPPER EYELID: Status: ACTIVE | Noted: 2023-08-27

## 2023-09-11 ENCOUNTER — OFFICE VISIT (OUTPATIENT)
Dept: OPHTHALMOLOGY | Facility: CLINIC | Age: 53
End: 2023-09-11
Payer: COMMERCIAL

## 2023-09-11 DIAGNOSIS — L30.9 PERIOCULAR DERMATITIS: ICD-10-CM

## 2023-09-11 DIAGNOSIS — H52.4 PRESBYOPIA: ICD-10-CM

## 2023-09-11 DIAGNOSIS — Z01.00 EXAMINATION OF EYES AND VISION: Primary | ICD-10-CM

## 2023-09-11 DIAGNOSIS — H18.513 CORNEAL GUTTATA OF BOTH EYES: ICD-10-CM

## 2023-09-11 PROCEDURE — 92014 COMPRE OPH EXAM EST PT 1/>: CPT | Performed by: OPHTHALMOLOGY

## 2023-09-11 PROCEDURE — 92015 DETERMINE REFRACTIVE STATE: CPT | Performed by: OPHTHALMOLOGY

## 2023-09-11 RX ORDER — NEOMYCIN SULFATE, POLYMYXIN B SULFATE, AND DEXAMETHASONE 3.5; 10000; 1 MG/G; [USP'U]/G; MG/G
OINTMENT OPHTHALMIC
Qty: 3.5 G | Refills: 1 | Status: SHIPPED | OUTPATIENT
Start: 2023-09-11 | End: 2024-05-29

## 2023-09-11 ASSESSMENT — VISUAL ACUITY
OD_CC: J2
OS_SC: 20/30
METHOD: SNELLEN - LINEAR
OS_CC: J1
OD_SC: 20/30
OS_SC+: -1

## 2023-09-11 ASSESSMENT — REFRACTION_WEARINGRX
OD_CYLINDER: SPHERE
OD_CYLINDER: +0.75
OD_SPHERE: +1.50
OD_ADD: +1.50
OS_ADD: +1.75
OS_SPHERE: +1.50
SPECS_TYPE: BIFOCAL
OS_AXIS: 156
OD_SPHERE: PLANO
OS_CYLINDER: +0.75
OS_CYLINDER: SPHERE
OS_SPHERE: -0.50
OD_AXIS: 006
SPECS_TYPE: OTC

## 2023-09-11 ASSESSMENT — CUP TO DISC RATIO
OD_RATIO: 0.2
OS_RATIO: 0.3

## 2023-09-11 ASSESSMENT — REFRACTION_MANIFEST
OD_CYLINDER: +1.00
OD_ADD: +2.50
OS_CYLINDER: +1.00
OD_SPHERE: PLANO
OD_AXIS: 007
OS_ADD: +2.50
OS_SPHERE: PLANO
OS_AXIS: 170

## 2023-09-11 ASSESSMENT — CONF VISUAL FIELD
OD_INFERIOR_TEMPORAL_RESTRICTION: 0
OS_SUPERIOR_NASAL_RESTRICTION: 0
OD_SUPERIOR_NASAL_RESTRICTION: 0
OD_NORMAL: 1
OD_INFERIOR_NASAL_RESTRICTION: 0
OS_NORMAL: 1
OS_SUPERIOR_TEMPORAL_RESTRICTION: 0
OD_SUPERIOR_TEMPORAL_RESTRICTION: 0
OS_INFERIOR_TEMPORAL_RESTRICTION: 0
OS_INFERIOR_NASAL_RESTRICTION: 0

## 2023-09-11 ASSESSMENT — SLIT LAMP EXAM - LIDS: COMMENTS: 1+ DERMATOCHALASIS - UPPER LID

## 2023-09-11 ASSESSMENT — TONOMETRY
IOP_METHOD: APPLANATION
OD_IOP_MMHG: 15
OS_IOP_MMHG: 19

## 2023-09-11 ASSESSMENT — EXTERNAL EXAM - LEFT EYE: OS_EXAM: NORMAL

## 2023-09-11 NOTE — PROGRESS NOTES
" Current Eye Medications:  Ocusoft lid scrubs.  Hot packs twice a day.       Subjective:  Comprehensive Eye Exam.  The bump resolved from her right upper eyelid with Keflex, but is returned 9-5-23.  She feels she is having some scaling of her eyelid, and some redness.  Patient complains of some decreasing vision in each eye.  Currently she is wearing +1.50 over-the-counter readers.  History of bifocals, but they give her a headache.       Objective:  See Ophthalmology Exam.       Assessment:  Right upper lid now with periocular dermatitis.  Otherwise stable eye exam.      ICD-10-CM    1. Examination of eyes and vision  Z01.00       2. Presbyopia  H52.4 REFRACTIVE STATUS     EYE EXAM (SIMPLE-NONBILLABLE)      3. 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 Periocular dermatitis  L30.9            Plan:  Glasses prescription given - optional  Can continue wearing over the counter reading glasses     Begin: Fish/Dex ointment - apply a small squirt to the right upper lid three times daily until improved     May use artificial tears up to four times a day (like Refresh Optive, Systane Balance, TheraTears, or generic artificial tears are ok. Avoid \"get the red out\" drops).     Call in May 2025 for an appointment in September 2025 for Complete Exam    Dr. Bang [phone]    "

## 2023-09-11 NOTE — LETTER
"    9/11/2023         RE: Bjorn Sykes  [address] [place] [address]        Dear Colleague,    Thank you for referring your patient, jBorn Sykes, to the [facility]. Please see a copy of my visit note below.     Current Eye Medications:  Ocusoft lid scrubs.  Hot packs twice a day.       Subjective:  Comprehensive Eye Exam.  The bump resolved from her right upper eyelid with Keflex, but is returned 9-5-23.  She feels she is having some scaling of her eyelid, and some redness.  Patient complains of some decreasing vision in each eye.  Currently she is wearing +1.50 over-the-counter readers.  History of bifocals, but they give her a headache.       Objective:  See Ophthalmology Exam.       Assessment:  Right upper lid now with periocular dermatitis.  Otherwise stable eye exam.      ICD-10-CM    1. Examination of eyes and vision  Z01.00       2. Presbyopia  H52.4 REFRACTIVE STATUS     EYE EXAM (SIMPLE-NONBILLABLE)      3. 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 Periocular dermatitis  L30.9            Plan:  Glasses prescription given - optional  Can continue wearing over the counter reading glasses     Begin: Fish/Dex ointment - apply a small squirt to the right upper lid three times daily until improved     May use artificial tears up to four times a day (like Refresh Optive, Systane Balance, TheraTears, or generic artificial tears are ok. Avoid \"get the red out\" drops).     Call in May 2025 for an appointment in September 2025 for Complete Exam    Dr. Bang [phone]      Again, thank you for allowing me to participate in the care of your patient.        Sincerely,        Jamar Bang MD  "

## 2023-09-11 NOTE — PATIENT INSTRUCTIONS
"Glasses prescription given - optional  Can continue wearing over the counter reading glasses     Begin: Fish/Dex ointment - apply a small squirt to the right upper lid three times daily until improved     May use artificial tears up to four times a day (like Refresh Optive, Systane Balance, TheraTears, or generic artificial tears are ok. Avoid \"get the red out\" drops).     Call in May 2025 for an appointment in September 2025 for Complete Exam    Dr. Bang [phone]    "

## 2024-04-20 ENCOUNTER — HEALTH MAINTENANCE LETTER (OUTPATIENT)
Age: 54
End: 2024-04-20

## 2024-05-29 ENCOUNTER — OFFICE VISIT (OUTPATIENT)
Dept: OBGYN | Facility: CLINIC | Age: 54
End: 2024-05-29
Payer: COMMERCIAL

## 2024-05-29 ENCOUNTER — ANCILLARY PROCEDURE (OUTPATIENT)
Dept: MAMMOGRAPHY | Facility: CLINIC | Age: 54
End: 2024-05-29
Attending: OBSTETRICS & GYNECOLOGY
Payer: COMMERCIAL

## 2024-05-29 VITALS
DIASTOLIC BLOOD PRESSURE: 82 MMHG | BODY MASS INDEX: 27.39 KG/M2 | WEIGHT: 164.4 LBS | SYSTOLIC BLOOD PRESSURE: 127 MMHG | HEIGHT: 65 IN | HEART RATE: 74 BPM | OXYGEN SATURATION: 96 %

## 2024-05-29 DIAGNOSIS — Z12.31 VISIT FOR SCREENING MAMMOGRAM: ICD-10-CM

## 2024-05-29 DIAGNOSIS — Z01.419 ENCOUNTER FOR GYNECOLOGICAL EXAMINATION WITHOUT ABNORMAL FINDING: Primary | ICD-10-CM

## 2024-05-29 PROCEDURE — 77063 BREAST TOMOSYNTHESIS BI: CPT | Mod: TC | Performed by: RADIOLOGY

## 2024-05-29 PROCEDURE — 99396 PREV VISIT EST AGE 40-64: CPT | Performed by: OBSTETRICS & GYNECOLOGY

## 2024-05-29 PROCEDURE — 77067 SCR MAMMO BI INCL CAD: CPT | Mod: TC | Performed by: RADIOLOGY

## 2024-05-29 NOTE — PROGRESS NOTES
Bjorn Sykes is a 54 year old female , who presents for annual exam.   No unusual bleeding, no incontinence, or unusual discharge.   Last cholesterol:   Recent Labs   Lab Test 22  0829   CHOL 232*   HDL 80   *   TRIG 111     Past Medical History:   Diagnosis Date    Adnexal mass 2009    Diagnostic skin and sensitization tests  skin tests per [facility] pos. for M/T only.  skin tests [facility] pos. for: dog/DM/M only (T not retested)    Dysmenorrhea     IBU Helps    Intermittent asthma     Migraine     Osteopenia     [facility]    Ovarian carcinoma (H) 2009    LEFT side   Dr.Jonson Curt PANIAGUA  stage IA, mucinous borderline ovarian cancer.    Plantar warts s-       Past Surgical History:   Procedure Laterality Date    APPENDECTOMY OPEN  2009    see above    BUNIONECTOMY  10/16/2012    Procedure: BUNIONECTOMY;  Right Theo bunionectomy;  Surgeon: Daniel Arias MD;  Location: [facility]    COLONOSCOPY WITH CO2 INSUFFLATION N/A 2019    Procedure: COLONOSCOPY, WITH CO2 INSUFFLATION;  Surgeon: Toro Dover MD;  Location: [facility]    COMBINED ESOPHAGOSCOPY, GASTROSCOPY, DUODENOSCOPY (EGD) WITH CO2 INSUFFLATION N/A 2019    Procedure: ESOPHAGOGASTRODUODENOSCOPY, WITH CO2 INSUFFLATION;  Surgeon: Toro Dover MD;  Location: [facility] OR    D & C  2007    cysts in uterus    ESOPHAGOSCOPY, GASTROSCOPY, DUODENOSCOPY (EGD), COMBINED N/A 2019    Procedure: Esophagogastroduodenoscopy, With Biopsy;  Surgeon: Toro Dover MD;  Location: [facility]    HYSTERECTOMY TOTAL ABDOMINAL, BILATERAL SALPINGO-OOPHORECTOMY, COMBINED  2009    appy also    HYSTERECTOMY, PAP NO LONGER INDICATED         OB History    Para Term  AB Living   0 0 0 0 0 0   SAB IAB Ectopic Multiple Live Births   0 0 0 0 0       Gyn History:  Gynecological History            Patient's last menstrual period was 2009.          history of abnormal pap smear:  No  Last pap:    Lab Results   Component Value Date    PAP NIL 01/12/2016           Current Outpatient Medications   Medication Sig Dispense Refill    albuterol (PROAIR HFA/PROVENTIL HFA/VENTOLIN HFA) 108 (90 BASE) MCG/ACT Inhaler Inhale 2 puffs into the lungs every 4 hours as needed for shortness of breath / dyspnea 1 Inhaler 2    ascorbic acid (VITAMIN C) 500 MG tablet Take 500 mg by mouth as needed.      Calcium Carbonate-Vitamin D (CALCIUM 500 + D PO) Take by mouth 2 times daily      cetirizine (ZYRTEC) 10 MG tablet Take 1 tablet (10 mg) by mouth daily 90 tablet 3    Glucosamine-Chondroit-Vit C-Mn (GLUCOSAMINE CHONDR 1500 COMPLX PO) Take  by mouth 2 times daily.      IBUPROFEN PO Take  by mouth as needed.      mometasone (ELOCON) 0.1 % cream Apply topically daily as needed Apply 1 dose topically daily as needed 45 g 3    mometasone (NASONEX) 50 MCG/ACT nasal spray Spray 2 sprays into both nostrils daily 17 g 3    MULTIVITAMIN OR once daily      olopatadine (PATADAY) 0.2 % ophthalmic solution Place 1 drop into both eyes daily 1 Bottle 3    cephALEXin (KEFLEX) 500 MG capsule Take 1 capsule (500 mg) by mouth 3 times daily (Patient not taking: Reported on 5/29/2024) 21 capsule 1    neomycin-polymyxin-dexAMETHasone (MAXITROL) 3.5-66626-7.1 ophthalmic ointment Apply a small squirt to right upper lid three times daily (Patient not taking: Reported on 5/29/2024) 3.5 g 1       Allergies   Allergen Reactions    Flagyl [Metronidazole] Rash    Sulfa Antibiotics Rash    Adhesive Tape Rash       Social History     Socioeconomic History    Marital status:      Spouse name: Not on file    Number of children: Not on file    Years of education: Not on file    Highest education level: Not on file   Occupational History    Occupation: pharmacist   Tobacco Use    Smoking status: Never    Smokeless tobacco: Never   Substance and Sexual Activity    Alcohol use: Yes     Comment: occasional    Drug use: No    Sexual activity: Yes     Partners:  "Male     Birth control/protection: Surgical   Other Topics Concern    Parent/sibling w/ CABG, MI or angioplasty before 65F 55M? Not Asked   Social History Narrative    March 26, 2018    ENVIRONMENTAL HISTORY: [place] The home is heated with a forced air. They do have central air conditioning. The patient's bedroom is furnished with hard jorge in bedroom, allergen mattress cover and allergen pillowcase cover. No pets inside the house. There is no  history of cockroach or mice infestation. There are no smokers in the house.  The house does have a damp basement, use a dehumidifier in the summer.    1961     Social Determinants of Health     Financial Resource Strain: Not on file   Food Insecurity: Not on file   Transportation Needs: Not on file   Physical Activity: Not on file   Stress: Not on file   Social Connections: Not on file   Interpersonal Safety: Not on file   Housing Stability: Not on file       Family History   Problem Relation Age of Onset    Alcohol/Drug Father     C.A.D. Paternal Grandfather     Hypertension Paternal Grandfather     Cerebrovascular Disease Paternal Grandfather     Genetic Disorder Sister         Angelman's syndrome    Thyroid Disease Paternal Grandmother     Breast Cancer Other         aunt, maternal    Alzheimer Disease Other         aunt    Thyroid Disease Sister     Macular Degeneration No family hx of     Glaucoma No family hx of          ROS:  All negative except as above.      EXAM:  /82 (BP Location: Right arm, Cuff Size: Adult Regular)   Pulse 74   Ht 1.641 m (5' 4.6\")   Wt 74.6 kg (164 lb 6.4 oz)   LMP 07/05/2009   SpO2 96%   BMI 27.70 kg/m    General:  WNWD female, NAD  Alert  Oriented x 3  Gait:  Normal  Skin:  Normal skin turgor  Neurologic:  CN grossly intact, good sensation.    HEENT:  NC/AT, EOMI  Neck:  No masses palpated, symmetrical, carotids +2/4, no bruits heard  Heart:  RRR  Lungs:  Clear   Breasts:  " Symmetrical, no dimpling noted, no masses palpated, no discharge expressed  Abdomen:  Non-tender, non-distended.  Vulva: No external lesions, normal hair distribution, no adenopathy  BUS:  Normal, no masses noted  Urethra:  No hypermobility noted  Urethral meatus:  No masses noted  Vagina: Atrophic, no lesions  Cervix: absent   Uterus:  Absent  Ovaries/Bimanual: No masses palpated, No tenderness   Perianal:  No masses noted.   Rectal exam:  Declined   Extremities:  No clubbing, cyanosis, or edema      ASSESSMENT/PLAN   Annual examination   Low fat diet, weight bearing exercises and self breast exams on a monthly basis have been recommended.  I have discussed with patient the risks, benefits, medications, treatment options and modalities.   I have instructed the patient to call or schedule a follow-up appointment if any problems.

## 2025-04-22 ENCOUNTER — OFFICE VISIT (OUTPATIENT)
Dept: DERMATOLOGY | Facility: CLINIC | Age: 55
End: 2025-04-22
Payer: COMMERCIAL

## 2025-04-22 DIAGNOSIS — D22.9 MULTIPLE BENIGN NEVI: ICD-10-CM

## 2025-04-22 DIAGNOSIS — D18.01 CHERRY ANGIOMA: ICD-10-CM

## 2025-04-22 DIAGNOSIS — K13.0 CHEILITIS: Primary | ICD-10-CM

## 2025-04-22 DIAGNOSIS — L82.1 SEBORRHEIC KERATOSIS: ICD-10-CM

## 2025-04-22 DIAGNOSIS — L81.4 LENTIGO: ICD-10-CM

## 2025-04-22 PROCEDURE — G2211 COMPLEX E/M VISIT ADD ON: HCPCS | Performed by: PHYSICIAN ASSISTANT

## 2025-04-22 PROCEDURE — 99203 OFFICE O/P NEW LOW 30 MIN: CPT | Performed by: PHYSICIAN ASSISTANT

## 2025-04-22 RX ORDER — TRIAMCINOLONE ACETONIDE 0.25 MG/G
OINTMENT TOPICAL
Qty: 15 G | Refills: 2 | Status: SHIPPED | OUTPATIENT
Start: 2025-04-22

## 2025-04-22 NOTE — PROGRESS NOTES
Bjorn Sykes is a pleasant 54 year old year old female patient here today for skin check and lip dermatitis. She notes has tried numerous lip balms without improvements   Patient has no other skin complaints today.  Remainder of the HPI, Meds, PMH, Allergies, FH, and SH was reviewed in chart.    Pertinent Hx:   No personal history of skin cancer.   Past Medical History:   Diagnosis Date    Adnexal mass 08/01/2009    Diagnostic skin and sensitization tests 8/98 skin tests per [facility] pos. for M/T only. 9/00 skin tests [facility] pos. for: dog/DM/M only (T not retested)    Dysmenorrhea     IBU Helps    Intermittent asthma     Migraine     Osteopenia     [facility]    Ovarian carcinoma (H) 08/01/2009    LEFT side   Dr.Jonson Curt PANIAGUA  stage IA, mucinous borderline ovarian cancer.    Plantar warts 1990's-2001       Past Surgical History:   Procedure Laterality Date    APPENDECTOMY OPEN  8/13/2009    see above    BUNIONECTOMY  10/16/2012    Procedure: BUNIONECTOMY;  Right Theo bunionectomy;  Surgeon: Daniel Arias MD;  Location: [facility]    COLONOSCOPY WITH CO2 INSUFFLATION N/A 6/25/2019    Procedure: COLONOSCOPY, WITH CO2 INSUFFLATION;  Surgeon: Toro Dover MD;  Location: [facility]    COMBINED ESOPHAGOSCOPY, GASTROSCOPY, DUODENOSCOPY (EGD) WITH CO2 INSUFFLATION N/A 6/25/2019    Procedure: ESOPHAGOGASTRODUODENOSCOPY, WITH CO2 INSUFFLATION;  Surgeon: Toro Dover MD;  Location: [facility]    D & C  8/2007    cysts in uterus    ESOPHAGOSCOPY, GASTROSCOPY, DUODENOSCOPY (EGD), COMBINED N/A 6/25/2019    Procedure: Esophagogastroduodenoscopy, With Biopsy;  Surgeon: Toro Dover MD;  Location: [facility]    HYSTERECTOMY TOTAL ABDOMINAL, BILATERAL SALPINGO-OOPHORECTOMY, COMBINED  8/13/2009    appy also    HYSTERECTOMY, PAP NO LONGER INDICATED          Family History   Problem Relation Age of Onset    Alcohol/Drug Father     C.A.D. Paternal Grandfather     Hypertension Paternal Grandfather     Cerebrovascular  Disease Paternal Grandfather     Genetic Disorder Sister         Angelman's syndrome    Thyroid Disease Paternal Grandmother     Breast Cancer Other         aunt, maternal    Alzheimer Disease Other         aunt    Thyroid Disease Sister     Macular Degeneration No family hx of     Glaucoma No family hx of        Social History     Socioeconomic History    Marital status:      Spouse name: Not on file    Number of children: Not on file    Years of education: Not on file    Highest education level: Not on file   Occupational History    Occupation: pharmacist   Tobacco Use    Smoking status: Never    Smokeless tobacco: Never   Substance and Sexual Activity    Alcohol use: Yes     Comment: occasional    Drug use: No    Sexual activity: Yes     Partners: Male     Birth control/protection: Surgical   Other Topics Concern    Parent/sibling w/ CABG, MI or angioplasty before 65F 55M? Not Asked   Social History Narrative    March 26, 2018    ENVIRONMENTAL HISTORY: [place] The home is heated with a forced air. They do have central air conditioning. The patient's bedroom is furnished with hard jorge in bedroom, allergen mattress cover and allergen pillowcase cover. No pets inside the house. There is no  history of cockroach or mice infestation. There are no smokers in the house.  The house does have a damp basement, use a dehumidifier in the summer.    1961     Social Drivers of Health     Financial Resource Strain: Not on file   Food Insecurity: Not on file   Transportation Needs: Not on file   Physical Activity: Not on file   Stress: Not on file   Social Connections: Not on file   Interpersonal Safety: Not on file   Housing Stability: Not on file       Outpatient Encounter Medications as of 4/22/2025   Medication Sig Dispense Refill    triamcinolone (KENALOG) 0.025 % external ointment Apply sparingly three times daily to lips. 15 g 2    albuterol (PROAIR  HFA/PROVENTIL HFA/VENTOLIN HFA) 108 (90 BASE) MCG/ACT Inhaler Inhale 2 puffs into the lungs every 4 hours as needed for shortness of breath / dyspnea 1 Inhaler 2    ascorbic acid (VITAMIN C) 500 MG tablet Take 500 mg by mouth as needed.      Calcium Carbonate-Vitamin D (CALCIUM 500 + D PO) Take by mouth 2 times daily      cetirizine (ZYRTEC) 10 MG tablet Take 1 tablet (10 mg) by mouth daily 90 tablet 3    Glucosamine-Chondroit-Vit C-Mn (GLUCOSAMINE CHONDR 1500 COMPLX PO) Take  by mouth 2 times daily.      IBUPROFEN PO Take  by mouth as needed.      mometasone (ELOCON) 0.1 % cream Apply topically daily as needed Apply 1 dose topically daily as needed 45 g 3    mometasone (NASONEX) 50 MCG/ACT nasal spray Spray 2 sprays into both nostrils daily 17 g 3    MULTIVITAMIN OR once daily      olopatadine (PATADAY) 0.2 % ophthalmic solution Place 1 drop into both eyes daily 1 Bottle 3     No facility-administered encounter medications on file as of 4/22/2025.             O:   NAD, WDWN, Alert & Oriented, Mood & Affect wnl, Vitals stable   Here today alone   LMP 07/05/2009    General appearance normal   Vitals stable   Alert, oriented and in no acute distress     Stuck on papules and brown macules on trunk and ext   Red papules on trunk  Brown papules and macules with regular pigment network and borders on torso and extremities   Dry lips   The remainder of skin exam is normal       Eyes: Conjunctivae/lids:Normal     ENT: Lips: normal    MSK:Normal    Cardiovascular: peripheral edema none    Pulm: Breathing Normal    Neuro/Psych: Orientation:Alert and Orientedx3 ; Mood/Affect:normal     A/P:  1. Cheilitis   Apply triamcinolone bid x 2 weeks then prn.   Use cerave healing ointment.   2. Seborrheic keratosis, lentigo, angioma, benign nevi   It was a pleasure speaking to Bjorn Sykes today.  BENIGN LESIONS DISCUSSED WITH PATIENT:  I discussed the specifics of tumor, prognosis, and genetics of benign lesions.  I explained that  treatment of these lesions would be purely cosmetic and not medically neccessary.  I discussed with patient different removal options including excision, cautery and /or laser.      Nature and genetics of benign skin lesions dicussed with patient.  Signs and Symptoms of skin cancer discussed with patient.  ABCDEs of melanoma reviewed with patient.  Patient encouraged to perform monthly skin exams.  UV precautions reviewed with patient.  Risks of non-melanoma skin cancer discussed with patient   Return to clinic in one year or sooner if needed.

## 2025-04-22 NOTE — LETTER
4/22/2025      Bjorn Sykes  [address] [place] [address]      Dear Colleague,    Thank you for referring your patient, Bjorn Sykes, to the [facility]. Please see a copy of my visit note below.    Bjorn Sykes is a pleasant 54 year old year old female patient here today for skin check and lip dermatitis. She notes has tried numerous lip balms without improvements   Patient has no other skin complaints today.  Remainder of the HPI, Meds, PMH, Allergies, FH, and SH was reviewed in chart.    Pertinent Hx:   No personal history of skin cancer.   Past Medical History:   Diagnosis Date     Adnexal mass 08/01/2009     Diagnostic skin and sensitization tests 8/98 skin tests per [facility] pos. for M/T only. 9/00 skin tests [facility] pos. for: dog/DM/M only (T not retested)     Dysmenorrhea     IBU Helps     Intermittent asthma      Migraine      Osteopenia     [facility]     Ovarian carcinoma (H) 08/01/2009    LEFT side   Dr.Jonson Curt PANIAGUA  stage IA, mucinous borderline ovarian cancer.     Plantar warts 1990's-2001       Past Surgical History:   Procedure Laterality Date     APPENDECTOMY OPEN  8/13/2009    see above     BUNIONECTOMY  10/16/2012    Procedure: BUNIONECTOMY;  Right Theo bunionectomy;  Surgeon: Daniel Arias MD;  Location: [facility]     COLONOSCOPY WITH CO2 INSUFFLATION N/A 6/25/2019    Procedure: COLONOSCOPY, WITH CO2 INSUFFLATION;  Surgeon: Toro Dover MD;  Location: [facility]     COMBINED ESOPHAGOSCOPY, GASTROSCOPY, DUODENOSCOPY (EGD) WITH CO2 INSUFFLATION N/A 6/25/2019    Procedure: ESOPHAGOGASTRODUODENOSCOPY, WITH CO2 INSUFFLATION;  Surgeon: Toro Dover MD;  Location: [facility]     D & C  8/2007    cysts in uterus     ESOPHAGOSCOPY, GASTROSCOPY, DUODENOSCOPY (EGD), COMBINED N/A 6/25/2019    Procedure: Esophagogastroduodenoscopy, With Biopsy;  Surgeon: Toro Dover MD;  Location: [facility]     HYSTERECTOMY TOTAL ABDOMINAL, BILATERAL  SALPINGO-OOPHORECTOMY, COMBINED  8/13/2009    appy also     HYSTERECTOMY, PAP NO LONGER INDICATED          Family History   Problem Relation Age of Onset     Alcohol/Drug Father      C.A.D. Paternal Grandfather      Hypertension Paternal Grandfather      Cerebrovascular Disease Paternal Grandfather      Genetic Disorder Sister         Angelman's syndrome     Thyroid Disease Paternal Grandmother      Breast Cancer Other         aunt, maternal     Alzheimer Disease Other         aunt     Thyroid Disease Sister      Macular Degeneration No family hx of      Glaucoma No family hx of        Social History     Socioeconomic History     Marital status:      Spouse name: Not on file     Number of children: Not on file     Years of education: Not on file     Highest education level: Not on file   Occupational History     Occupation: pharmacist   Tobacco Use     Smoking status: Never     Smokeless tobacco: Never   Substance and Sexual Activity     Alcohol use: Yes     Comment: occasional     Drug use: No     Sexual activity: Yes     Partners: Male     Birth control/protection: Surgical   Other Topics Concern     Parent/sibling w/ CABG, MI or angioplasty before 65F 55M? Not Asked   Social History Narrative    March 26, 2018    ENVIRONMENTAL HISTORY: [place] The home is heated with a forced air. They do have central air conditioning. The patient's bedroom is furnished with hard jorge in bedroom, allergen mattress cover and allergen pillowcase cover. No pets inside the house. There is no  history of cockroach or mice infestation. There are no smokers in the house.  The house does have a damp basement, use a dehumidifier in the summer.    1961     Social Drivers of Health     Financial Resource Strain: Not on file   Food Insecurity: Not on file   Transportation Needs: Not on file   Physical Activity: Not on file   Stress: Not on file   Social Connections: Not on file    Interpersonal Safety: Not on file   Housing Stability: Not on file       Outpatient Encounter Medications as of 4/22/2025   Medication Sig Dispense Refill     triamcinolone (KENALOG) 0.025 % external ointment Apply sparingly three times daily to lips. 15 g 2     albuterol (PROAIR HFA/PROVENTIL HFA/VENTOLIN HFA) 108 (90 BASE) MCG/ACT Inhaler Inhale 2 puffs into the lungs every 4 hours as needed for shortness of breath / dyspnea 1 Inhaler 2     ascorbic acid (VITAMIN C) 500 MG tablet Take 500 mg by mouth as needed.       Calcium Carbonate-Vitamin D (CALCIUM 500 + D PO) Take by mouth 2 times daily       cetirizine (ZYRTEC) 10 MG tablet Take 1 tablet (10 mg) by mouth daily 90 tablet 3     Glucosamine-Chondroit-Vit C-Mn (GLUCOSAMINE CHONDR 1500 COMPLX PO) Take  by mouth 2 times daily.       IBUPROFEN PO Take  by mouth as needed.       mometasone (ELOCON) 0.1 % cream Apply topically daily as needed Apply 1 dose topically daily as needed 45 g 3     mometasone (NASONEX) 50 MCG/ACT nasal spray Spray 2 sprays into both nostrils daily 17 g 3     MULTIVITAMIN OR once daily       olopatadine (PATADAY) 0.2 % ophthalmic solution Place 1 drop into both eyes daily 1 Bottle 3     No facility-administered encounter medications on file as of 4/22/2025.             O:   NAD, WDWN, Alert & Oriented, Mood & Affect wnl, Vitals stable   Here today alone   LMP 07/05/2009    General appearance normal   Vitals stable   Alert, oriented and in no acute distress     Stuck on papules and brown macules on trunk and ext   Red papules on trunk  Brown papules and macules with regular pigment network and borders on torso and extremities   Dry lips   The remainder of skin exam is normal       Eyes: Conjunctivae/lids:Normal     ENT: Lips: normal    MSK:Normal    Cardiovascular: peripheral edema none    Pulm: Breathing Normal    Neuro/Psych: Orientation:Alert and Orientedx3 ; Mood/Affect:normal     A/P:  1. Cheilitis   Apply triamcinolone bid x 2 weeks  then prn.   Use cerave healing ointment.   2. Seborrheic keratosis, lentigo, angioma, benign nevi   It was a pleasure speaking to Bjorn Sykes today.  BENIGN LESIONS DISCUSSED WITH PATIENT:  I discussed the specifics of tumor, prognosis, and genetics of benign lesions.  I explained that treatment of these lesions would be purely cosmetic and not medically neccessary.  I discussed with patient different removal options including excision, cautery and /or laser.      Nature and genetics of benign skin lesions dicussed with patient.  Signs and Symptoms of skin cancer discussed with patient.  ABCDEs of melanoma reviewed with patient.  Patient encouraged to perform monthly skin exams.  UV precautions reviewed with patient.  Risks of non-melanoma skin cancer discussed with patient   Return to clinic in one year or sooner if needed.       Again, thank you for allowing me to participate in the care of your patient.        Sincerely,        Gina Samuel PA-C    Electronically signed

## 2025-04-29 ENCOUNTER — PATIENT OUTREACH (OUTPATIENT)
Dept: CARE COORDINATION | Facility: CLINIC | Age: 55
End: 2025-04-29
Payer: COMMERCIAL

## 2025-05-13 ENCOUNTER — PATIENT OUTREACH (OUTPATIENT)
Dept: CARE COORDINATION | Facility: CLINIC | Age: 55
End: 2025-05-13
Payer: COMMERCIAL

## 2025-06-06 NOTE — PATIENT INSTRUCTIONS
If you have any questions regarding your visit, Please contact your care team.    To Schedule an Appointment 24/7  Call: [phone]  Women s Health  TELEPHONE NUMBER   Lalo Ron M.D.    [facility]-Medical Assistant    Samaria Pennington-Surgery Scheduler  [place]- Tuesday-[place]                   7:30 a.m.-3:30 p.m.  Wednesday-[place]             8:00 a.m.-4:30 p.m.  Thursday-[place]     8:00 a.m.-4:00 p.m.  Friday-[place]                       7:30 a.m.-1:00 p.m. [facility]  [address] [place] [address].  [place] [address] [phone] Phone  [phone] Fax    Imaging Scheduling all locations  [phone]       Labor and Delivery  [address]  [place], [place] [address] [phone]    [facility]  [address] [place] [id number] [phone] ask for Women's Clinic     **Surgeries** Our Surgery Schedulers will contact you to schedule. If you do not receive a call within 3 business days, please call [phone].    Urgent Care locations:  [facility] Monday-Friday                 10 am - 8 pm  Saturday and Sunday        9 am - 5 pm   [phone]   If you need a medication refill, please contact your pharmacy. Please allow 3 business days for your refill to be completed.  As always, Thank you for trusting us with your healthcare needs!  If you have any questions regarding your visit, Please contact your care team.    [employer]: [phone]    To Schedule an Appointment 24/7  Call: [phone]    see additional instructions from your care team below

## 2025-06-10 ENCOUNTER — OFFICE VISIT (OUTPATIENT)
Dept: OBGYN | Facility: CLINIC | Age: 55
End: 2025-06-10
Payer: COMMERCIAL

## 2025-06-10 ENCOUNTER — RESULTS FOLLOW-UP (OUTPATIENT)
Dept: OBGYN | Facility: CLINIC | Age: 55
End: 2025-06-10

## 2025-06-10 ENCOUNTER — ANCILLARY PROCEDURE (OUTPATIENT)
Dept: MAMMOGRAPHY | Facility: CLINIC | Age: 55
End: 2025-06-10
Attending: OBSTETRICS & GYNECOLOGY
Payer: COMMERCIAL

## 2025-06-10 VITALS
SYSTOLIC BLOOD PRESSURE: 125 MMHG | HEART RATE: 67 BPM | BODY MASS INDEX: 27.8 KG/M2 | OXYGEN SATURATION: 96 % | DIASTOLIC BLOOD PRESSURE: 85 MMHG | WEIGHT: 165 LBS

## 2025-06-10 DIAGNOSIS — Z12.11 SCREEN FOR COLON CANCER: ICD-10-CM

## 2025-06-10 DIAGNOSIS — Z12.31 VISIT FOR SCREENING MAMMOGRAM: ICD-10-CM

## 2025-06-10 DIAGNOSIS — Z13.1 SCREENING FOR DIABETES MELLITUS: ICD-10-CM

## 2025-06-10 DIAGNOSIS — L30.9 PERIOCULAR DERMATITIS: ICD-10-CM

## 2025-06-10 DIAGNOSIS — Z01.419 ENCOUNTER FOR GYNECOLOGICAL EXAMINATION WITHOUT ABNORMAL FINDING: Primary | ICD-10-CM

## 2025-06-10 LAB
EST. AVERAGE GLUCOSE BLD GHB EST-MCNC: 117 MG/DL
HBA1C MFR BLD: 5.7 % (ref 0–5.6)

## 2025-06-10 PROCEDURE — 77067 SCR MAMMO BI INCL CAD: CPT | Mod: TC | Performed by: RADIOLOGY

## 2025-06-10 PROCEDURE — 99396 PREV VISIT EST AGE 40-64: CPT | Performed by: OBSTETRICS & GYNECOLOGY

## 2025-06-10 PROCEDURE — 83036 HEMOGLOBIN GLYCOSYLATED A1C: CPT | Performed by: OBSTETRICS & GYNECOLOGY

## 2025-06-10 PROCEDURE — 36415 COLL VENOUS BLD VENIPUNCTURE: CPT | Performed by: OBSTETRICS & GYNECOLOGY

## 2025-06-10 PROCEDURE — 77063 BREAST TOMOSYNTHESIS BI: CPT | Mod: TC | Performed by: RADIOLOGY

## 2025-06-10 RX ORDER — NEOMYCIN SULFATE, POLYMYXIN B SULFATE, AND DEXAMETHASONE 3.5; 10000; 1 MG/G; [USP'U]/G; MG/G
OINTMENT OPHTHALMIC
Qty: 3.5 G | Refills: 1 | Status: SHIPPED | OUTPATIENT
Start: 2025-06-10

## 2025-06-10 NOTE — PROGRESS NOTES
Bjorn Sykes is a 55 year old female , who presents for annual exam.   No unusual bleeding, no incontinence, or unusual discharge.   Last cholesterol:   Recent Labs   Lab Test 22  0829   CHOL 232*   HDL 80   *   TRIG 111     Past Medical History:   Diagnosis Date    Adnexal mass 2009    Diagnostic skin and sensitization tests  skin tests per [facility] pos. for M/T only.  skin tests [facility] pos. for: dog/DM/M only (T not retested)    Dysmenorrhea     IBU Helps    Intermittent asthma     Migraine     Osteopenia     [facility]    Ovarian carcinoma (H) 2009    LEFT side   Dr.Jonson Curt PANIAGUA  stage IA, mucinous borderline ovarian cancer.    Plantar warts s-       Past Surgical History:   Procedure Laterality Date    APPENDECTOMY OPEN  2009    see above    BUNIONECTOMY  10/16/2012    Procedure: BUNIONECTOMY;  Right Theo bunionectomy;  Surgeon: Daniel Arias MD;  Location: [facility]    COLONOSCOPY WITH CO2 INSUFFLATION N/A 2019    Procedure: COLONOSCOPY, WITH CO2 INSUFFLATION;  Surgeon: Toro Dover MD;  Location: [facility]    COMBINED ESOPHAGOSCOPY, GASTROSCOPY, DUODENOSCOPY (EGD) WITH CO2 INSUFFLATION N/A 2019    Procedure: ESOPHAGOGASTRODUODENOSCOPY, WITH CO2 INSUFFLATION;  Surgeon: Toro Dover MD;  Location: [facility]    D & C  2007    cysts in uterus    ESOPHAGOSCOPY, GASTROSCOPY, DUODENOSCOPY (EGD), COMBINED N/A 2019    Procedure: Esophagogastroduodenoscopy, With Biopsy;  Surgeon: Toro Dover MD;  Location: [facility]    HYSTERECTOMY TOTAL ABDOMINAL, BILATERAL SALPINGO-OOPHORECTOMY, COMBINED  2009    appy also    HYSTERECTOMY, PAP NO LONGER INDICATED         OB History    Para Term  AB Living   0 0 0 0 0 0   SAB IAB Ectopic Multiple Live Births   0 0 0 0 0       Gyn History:  Gynecological History            Patient's last menstrual period was 2009.         history of abnormal pap smear:  no   Last pap:    Lab Results   Component Value Date    PAP NIL 01/12/2016           Current Outpatient Medications   Medication Sig Dispense Refill    albuterol (PROAIR HFA/PROVENTIL HFA/VENTOLIN HFA) 108 (90 BASE) MCG/ACT Inhaler Inhale 2 puffs into the lungs every 4 hours as needed for shortness of breath / dyspnea 1 Inhaler 2    ascorbic acid (VITAMIN C) 500 MG tablet Take 500 mg by mouth as needed.      Calcium Carbonate-Vitamin D (CALCIUM 500 + D PO) Take by mouth 2 times daily      cetirizine (ZYRTEC) 10 MG tablet Take 1 tablet (10 mg) by mouth daily 90 tablet 3    Glucosamine-Chondroit-Vit C-Mn (GLUCOSAMINE CHONDR 1500 COMPLX PO) Take  by mouth 2 times daily.      IBUPROFEN PO Take  by mouth as needed.      mometasone (NASONEX) 50 MCG/ACT nasal spray Spray 2 sprays into both nostrils daily 17 g 3    MULTIVITAMIN OR once daily      olopatadine (PATADAY) 0.2 % ophthalmic solution Place 1 drop into both eyes daily 1 Bottle 3    triamcinolone (KENALOG) 0.025 % external ointment Apply sparingly three times daily to lips. 15 g 2    mometasone (ELOCON) 0.1 % cream Apply topically daily as needed Apply 1 dose topically daily as needed 45 g 3       Allergies   Allergen Reactions    Flagyl [Metronidazole] Rash    Sulfa Antibiotics Rash    Adhesive Tape Rash       Social History     Socioeconomic History    Marital status:      Spouse name: Not on file    Number of children: Not on file    Years of education: Not on file    Highest education level: Not on file   Occupational History    Occupation: pharmacist   Tobacco Use    Smoking status: Never    Smokeless tobacco: Never   Substance and Sexual Activity    Alcohol use: Yes     Comment: occasional    Drug use: No    Sexual activity: Yes     Partners: Male     Birth control/protection: Surgical   Other Topics Concern    Parent/sibling w/ CABG, MI or angioplasty before 65F 55M? Not Asked   Social History Narrative    March 26, 2018    ENVIRONMENTAL HISTORY: [place] The home is heated with a forced air. They do have central air conditioning. The patient's bedroom is furnished with hard jorge in bedroom, allergen mattress cover and allergen pillowcase cover. No pets inside the house. There is no  history of cockroach or mice infestation. There are no smokers in the house.  The house does have a damp basement, use a dehumidifier in the summer.    1961     Social Drivers of Health     Financial Resource Strain: Not on file   Food Insecurity: Not on file   Transportation Needs: Not on file   Physical Activity: Not on file   Stress: Not on file   Social Connections: Not on file   Interpersonal Safety: Not on file   Housing Stability: Not on file       Family History   Problem Relation Age of Onset    Alcohol/Drug Father     C.A.D. Paternal Grandfather     Hypertension Paternal Grandfather     Cerebrovascular Disease Paternal Grandfather     Genetic Disorder Sister         Angelman's syndrome    Thyroid Disease Paternal Grandmother     Breast Cancer Other         aunt, maternal    Alzheimer Disease Other         aunt    Thyroid Disease Sister     Macular Degeneration No family hx of     Glaucoma No family hx of          ROS:  All negative except as above.      EXAM:  /85 (BP Location: Right arm, Cuff Size: Adult Regular)   Pulse 67   Wt 74.8 kg (165 lb)   LMP 07/05/2009   SpO2 96%   BMI 27.80 kg/m    General:  WNWD female, NAD  Alert  Oriented x 3  Gait:  Normal  Skin:  Normal skin turgor  Neurologic:  CN grossly intact, good sensation.    HEENT:  NC/AT, EOMI  Neck:  No masses palpated, symmetrical, carotids +2/4, no bruits heard  Heart:  RRR  Lungs:  Clear   Breasts:  Symmetrical, no dimpling noted, no masses palpated, no discharge expressed  Abdomen:  Non-tender, non-distended.  Vulva: No external lesions, normal hair distribution, no adenopathy  BUS:  Normal, no masses noted  Urethra:  No hypermobility noted  Urethral meatus:  No  masses noted  Vagina: Atrophic, pink, no abnormal discharge, no lesions  Cervix: Absent   Uterus: Normal size, anteverted, non-tender, mobile  Ovaries: No mass, non-tender, mobile  Perianal:  No masses noted.    Rectal exam:  Declined   Extremities:  No clubbing, cyanosis, or edema      ASSESSMENT/PLAN   Annual examination   Mammogram was performed today and it is Category 1.    The colonoscopy was reviewed and she had two adenomas noted on the pathology, the recommendation was to repeat at 5 years.  She will schedule for the colonoscopy.    She has had some eye issues, recent onset this weekend.  She used an ointment previously for a similar finding, prescribed by Ophthalmology, and I reordered it (Fish-Dex).   Low fat diet, weight bearing exercises and self breast exams on a monthly basis have been recommended.  I have discussed with patient the risks, benefits, medications, treatment options and modalities.   I have instructed the patient to call or schedule a follow-up appointment if any problems.

## (undated) DEVICE — SOL WATER IRRIG 1000ML BOTTLE 07139-09

## (undated) DEVICE — PREP CHLORAPREP 26ML TINTED ORANGE  260815

## (undated) RX ORDER — FENTANYL CITRATE 50 UG/ML
INJECTION, SOLUTION INTRAMUSCULAR; INTRAVENOUS
Status: DISPENSED
Start: 2019-06-25